# Patient Record
Sex: FEMALE | Race: BLACK OR AFRICAN AMERICAN | NOT HISPANIC OR LATINO
[De-identification: names, ages, dates, MRNs, and addresses within clinical notes are randomized per-mention and may not be internally consistent; named-entity substitution may affect disease eponyms.]

---

## 2019-11-25 ENCOUNTER — RESULT REVIEW (OUTPATIENT)
Age: 44
End: 2019-11-25

## 2019-12-03 ENCOUNTER — OUTPATIENT (OUTPATIENT)
Dept: OUTPATIENT SERVICES | Facility: HOSPITAL | Age: 44
LOS: 1 days | End: 2019-12-03

## 2019-12-03 VITALS
HEART RATE: 68 BPM | DIASTOLIC BLOOD PRESSURE: 80 MMHG | OXYGEN SATURATION: 99 % | WEIGHT: 123.02 LBS | SYSTOLIC BLOOD PRESSURE: 122 MMHG | RESPIRATION RATE: 16 BRPM | HEIGHT: 63 IN | TEMPERATURE: 99 F

## 2019-12-03 DIAGNOSIS — Z84.89 FAMILY HISTORY OF OTHER SPECIFIED CONDITIONS: ICD-10-CM

## 2019-12-03 DIAGNOSIS — D48.62 NEOPLASM OF UNCERTAIN BEHAVIOR OF LEFT BREAST: ICD-10-CM

## 2019-12-03 DIAGNOSIS — Z98.890 OTHER SPECIFIED POSTPROCEDURAL STATES: Chronic | ICD-10-CM

## 2019-12-03 DIAGNOSIS — D48.9 NEOPLASM OF UNCERTAIN BEHAVIOR, UNSPECIFIED: ICD-10-CM

## 2019-12-03 LAB
ANION GAP SERPL CALC-SCNC: 16 MMO/L — HIGH (ref 7–14)
BUN SERPL-MCNC: 10 MG/DL — SIGNIFICANT CHANGE UP (ref 7–23)
CALCIUM SERPL-MCNC: 9.8 MG/DL — SIGNIFICANT CHANGE UP (ref 8.4–10.5)
CHLORIDE SERPL-SCNC: 105 MMOL/L — SIGNIFICANT CHANGE UP (ref 98–107)
CO2 SERPL-SCNC: 18 MMOL/L — LOW (ref 22–31)
CREAT SERPL-MCNC: 0.49 MG/DL — LOW (ref 0.5–1.3)
GLUCOSE SERPL-MCNC: 71 MG/DL — SIGNIFICANT CHANGE UP (ref 70–99)
HCG SERPL-ACNC: < 5 MIU/ML — SIGNIFICANT CHANGE UP
HCT VFR BLD CALC: 36.2 % — SIGNIFICANT CHANGE UP (ref 34.5–45)
HGB BLD-MCNC: 11.8 G/DL — SIGNIFICANT CHANGE UP (ref 11.5–15.5)
MCHC RBC-ENTMCNC: 27.2 PG — SIGNIFICANT CHANGE UP (ref 27–34)
MCHC RBC-ENTMCNC: 32.6 % — SIGNIFICANT CHANGE UP (ref 32–36)
MCV RBC AUTO: 83.4 FL — SIGNIFICANT CHANGE UP (ref 80–100)
NRBC # FLD: 0 K/UL — SIGNIFICANT CHANGE UP (ref 0–0)
PLATELET # BLD AUTO: 331 K/UL — SIGNIFICANT CHANGE UP (ref 150–400)
PMV BLD: 11.3 FL — SIGNIFICANT CHANGE UP (ref 7–13)
POTASSIUM SERPL-MCNC: 3.5 MMOL/L — SIGNIFICANT CHANGE UP (ref 3.5–5.3)
POTASSIUM SERPL-SCNC: 3.5 MMOL/L — SIGNIFICANT CHANGE UP (ref 3.5–5.3)
RBC # BLD: 4.34 M/UL — SIGNIFICANT CHANGE UP (ref 3.8–5.2)
RBC # FLD: 14.9 % — HIGH (ref 10.3–14.5)
SODIUM SERPL-SCNC: 139 MMOL/L — SIGNIFICANT CHANGE UP (ref 135–145)
WBC # BLD: 5.27 K/UL — SIGNIFICANT CHANGE UP (ref 3.8–10.5)
WBC # FLD AUTO: 5.27 K/UL — SIGNIFICANT CHANGE UP (ref 3.8–10.5)

## 2019-12-03 NOTE — H&P PST ADULT - BREASTS DETAILS
left breast mass ; surgeon with original studies h/o left breast mass ; surgeon with original studies

## 2019-12-03 NOTE — H&P PST ADULT - HISTORY OF PRESENT ILLNESS
Pt is a 44 y.o. female ; pt reports recent mammogram ; pt reports bilateral breast nodule ; pt states a biopsy was done " right breast was fine " Pt unclear ; pt to surgeon  regarding left breast "  Pt now presents for Biopsy Left Breast with Cherise   Localization    Pt denies breast pain , denies nipple discharge Pt is a 44 y.o. female ; pt reports recent mammogram ; pt reports bilateral breast nodule ; pt states a biopsy was done " right breast was fine " Pt states biopsy  left breast "suspicious " ; pt to surgeon  regarding left breast "  Pt now presents for Biopsy Left Breast with Cherise  Localization    Pt denies breast pain , denies nipple discharge

## 2019-12-03 NOTE — H&P PST ADULT - NEGATIVE NEUROLOGICAL SYMPTOMS
no transient paralysis/no weakness/no focal seizures/no syncope/no tremors/no paresthesias/no generalized seizures

## 2019-12-03 NOTE — H&P PST ADULT - NSANTHOSAYNRD_GEN_A_CORE
No. GENESIS screening performed.  STOP BANG Legend: 0-2 = LOW Risk; 3-4 = INTERMEDIATE Risk; 5-8 = HIGH Risk

## 2019-12-03 NOTE — H&P PST ADULT - NSICDXPROBLEM_GEN_ALL_CORE_FT
PROBLEM DIAGNOSES  Problem: Family history of neoplasm of uncertain behavior of breast  Assessment and Plan: Biopsy left Breast with Cherise  localization  Pre op instructions including Hibiclens with teach back reviewed with pt ; pt verbalized good understanding of pre op instructions  Pt to Dr Dumont for pre op medical evaluation  Cup provided for [facility] dos PROBLEM DIAGNOSES  Problem: Neoplasm of uncertain behavior  Assessment and Plan: Biopsy  Left Breast with Cherise  Localization   Pre op instructions including Hibiclens with teach back reviewed with pt; pt verbalized good understanding of pre op instructions  Pt to Dr Dumont for pre op medical evaluation  Cup provided for [facility] dos

## 2019-12-06 PROBLEM — Z87.898 PERSONAL HISTORY OF OTHER SPECIFIED CONDITIONS: Chronic | Status: ACTIVE | Noted: 2019-12-03

## 2019-12-16 ENCOUNTER — OUTPATIENT (OUTPATIENT)
Dept: OUTPATIENT SERVICES | Facility: HOSPITAL | Age: 44
LOS: 1 days | End: 2019-12-16
Payer: COMMERCIAL

## 2019-12-16 ENCOUNTER — APPOINTMENT (OUTPATIENT)
Dept: MAMMOGRAPHY | Facility: IMAGING CENTER | Age: 44
End: 2019-12-16
Payer: COMMERCIAL

## 2019-12-16 DIAGNOSIS — Z98.890 OTHER SPECIFIED POSTPROCEDURAL STATES: Chronic | ICD-10-CM

## 2019-12-16 DIAGNOSIS — Z00.8 ENCOUNTER FOR OTHER GENERAL EXAMINATION: ICD-10-CM

## 2019-12-16 PROCEDURE — C1739: CPT

## 2019-12-16 PROCEDURE — 19281 PERQ DEVICE BREAST 1ST IMAG: CPT

## 2019-12-16 PROCEDURE — 19281 PERQ DEVICE BREAST 1ST IMAG: CPT | Mod: LT

## 2019-12-18 ENCOUNTER — TRANSCRIPTION ENCOUNTER (OUTPATIENT)
Age: 44
End: 2019-12-18

## 2019-12-18 PROBLEM — Z00.00 ENCOUNTER FOR PREVENTIVE HEALTH EXAMINATION: Status: ACTIVE | Noted: 2019-12-18

## 2019-12-19 ENCOUNTER — OUTPATIENT (OUTPATIENT)
Dept: OUTPATIENT SERVICES | Facility: HOSPITAL | Age: 44
LOS: 1 days | Discharge: HOME | End: 2019-12-19
Payer: COMMERCIAL

## 2019-12-19 ENCOUNTER — OUTPATIENT (OUTPATIENT)
Dept: OUTPATIENT SERVICES | Facility: HOSPITAL | Age: 44
LOS: 1 days | End: 2019-12-19
Payer: COMMERCIAL

## 2019-12-19 ENCOUNTER — RESULT REVIEW (OUTPATIENT)
Age: 44
End: 2019-12-19

## 2019-12-19 ENCOUNTER — APPOINTMENT (OUTPATIENT)
Dept: MAMMOGRAPHY | Facility: IMAGING CENTER | Age: 44
End: 2019-12-19

## 2019-12-19 VITALS
DIASTOLIC BLOOD PRESSURE: 70 MMHG | SYSTOLIC BLOOD PRESSURE: 127 MMHG | TEMPERATURE: 97 F | HEART RATE: 67 BPM | WEIGHT: 123.02 LBS | RESPIRATION RATE: 18 BRPM | OXYGEN SATURATION: 99 % | HEIGHT: 63 IN

## 2019-12-19 VITALS
OXYGEN SATURATION: 100 % | RESPIRATION RATE: 12 BRPM | TEMPERATURE: 98 F | HEART RATE: 71 BPM | SYSTOLIC BLOOD PRESSURE: 125 MMHG | DIASTOLIC BLOOD PRESSURE: 74 MMHG

## 2019-12-19 DIAGNOSIS — Z98.890 OTHER SPECIFIED POSTPROCEDURAL STATES: Chronic | ICD-10-CM

## 2019-12-19 DIAGNOSIS — Z00.8 ENCOUNTER FOR OTHER GENERAL EXAMINATION: ICD-10-CM

## 2019-12-19 DIAGNOSIS — D48.62 NEOPLASM OF UNCERTAIN BEHAVIOR OF LEFT BREAST: ICD-10-CM

## 2019-12-19 PROCEDURE — 76098 X-RAY EXAM SURGICAL SPECIMEN: CPT | Mod: 26

## 2019-12-19 PROCEDURE — 88305 TISSUE EXAM BY PATHOLOGIST: CPT | Mod: 26

## 2019-12-19 PROCEDURE — 76098 X-RAY EXAM SURGICAL SPECIMEN: CPT

## 2019-12-19 RX ORDER — ACETAMINOPHEN WITH CODEINE 300MG-30MG
1 TABLET ORAL
Qty: 10 | Refills: 0
Start: 2019-12-19

## 2019-12-19 RX ORDER — SODIUM CHLORIDE 9 MG/ML
1000 INJECTION, SOLUTION INTRAVENOUS
Refills: 0 | Status: DISCONTINUED | OUTPATIENT
Start: 2019-12-19 | End: 2020-01-11

## 2019-12-19 NOTE — ASU DISCHARGE PLAN (ADULT/PEDIATRIC) - CALL YOUR DOCTOR IF YOU HAVE ANY OF THE FOLLOWING:
Nausea and vomiting that does not stop/Numbness, tingling, color or temperature change to extremity/Swelling that gets worse/Pain not relieved by Medications/Wound/Surgical Site with redness, or foul smelling discharge or pus/Increased irritability or sluggishness/Fever greater than (need to indicate Fahrenheit or Celsius)/Bleeding that does not stop Nausea and vomiting that does not stop/Unable to urinate/Swelling that gets worse/Pain not relieved by Medications/Fever greater than (need to indicate Fahrenheit or Celsius)/Bleeding that does not stop/Increased irritability or sluggishness/Wound/Surgical Site with redness, or foul smelling discharge or pus/Numbness, tingling, color or temperature change to extremity

## 2019-12-19 NOTE — BRIEF OPERATIVE NOTE - NSICDXBRIEFPROCEDURE_GEN_ALL_CORE_FT
PROCEDURES:  Biopsy, breast, with radar localization using MURTAZA  19-Dec-2019 13:10:34  Mamadou Charles

## 2019-12-19 NOTE — ASU DISCHARGE PLAN (ADULT/PEDIATRIC) - PAIN MANAGEMENT
Prescriptions electronically submitted to pharmacy from [facility] No advil/ aleve until after 6pm/Prescriptions electronically submitted to pharmacy from [facility]

## 2019-12-19 NOTE — ASU DISCHARGE PLAN (ADULT/PEDIATRIC) - FOLLOW UP APPOINTMENTS
911 or go to the nearest Emergency Room [facility] ([facility]):

## 2019-12-19 NOTE — ASU DISCHARGE PLAN (ADULT/PEDIATRIC) - BATHING
You may shower tomorrow. Do not remove your dressing when you shower, it will be removed at your follow up appointment./Shower only

## 2019-12-19 NOTE — ASU DISCHARGE PLAN (ADULT/PEDIATRIC) - CARE PROVIDER_API CALL
Miguel Hanson)  Surgery  [address] [place] [address]  Phone: [phone]  Fax: [phone]  Follow Up Time: 1 week

## 2019-12-24 LAB — SURGICAL PATHOLOGY STUDY: SIGNIFICANT CHANGE UP

## 2023-12-22 ENCOUNTER — INPATIENT (INPATIENT)
Facility: HOSPITAL | Age: 48
LOS: 4 days | Discharge: HOME | End: 2023-12-27
Attending: STUDENT IN AN ORGANIZED HEALTH CARE EDUCATION/TRAINING PROGRAM | Admitting: STUDENT IN AN ORGANIZED HEALTH CARE EDUCATION/TRAINING PROGRAM
Payer: COMMERCIAL

## 2023-12-22 VITALS
RESPIRATION RATE: 18 BRPM | SYSTOLIC BLOOD PRESSURE: 122 MMHG | HEART RATE: 105 BPM | TEMPERATURE: 97 F | OXYGEN SATURATION: 99 % | DIASTOLIC BLOOD PRESSURE: 85 MMHG

## 2023-12-22 DIAGNOSIS — Z98.890 OTHER SPECIFIED POSTPROCEDURAL STATES: Chronic | ICD-10-CM

## 2023-12-22 DIAGNOSIS — R41.82 ALTERED MENTAL STATUS, UNSPECIFIED: ICD-10-CM

## 2023-12-22 DIAGNOSIS — R41.89 OTHER SYMPTOMS AND SIGNS INVOLVING COGNITIVE FUNCTIONS AND AWARENESS: ICD-10-CM

## 2023-12-22 LAB
ALBUMIN SERPL ELPH-MCNC: 3.8 G/DL — SIGNIFICANT CHANGE UP (ref 3.3–5)
ALBUMIN SERPL ELPH-MCNC: 3.8 G/DL — SIGNIFICANT CHANGE UP (ref 3.3–5)
ALP SERPL-CCNC: 83 U/L — SIGNIFICANT CHANGE UP (ref 40–120)
ALP SERPL-CCNC: 83 U/L — SIGNIFICANT CHANGE UP (ref 40–120)
ALT FLD-CCNC: 11 U/L — SIGNIFICANT CHANGE UP (ref 4–33)
ALT FLD-CCNC: 11 U/L — SIGNIFICANT CHANGE UP (ref 4–33)
ANION GAP SERPL CALC-SCNC: 21 MMOL/L — HIGH (ref 7–14)
ANION GAP SERPL CALC-SCNC: 21 MMOL/L — HIGH (ref 7–14)
APTT BLD: 31.3 SEC — SIGNIFICANT CHANGE UP (ref 24.5–35.6)
APTT BLD: 31.3 SEC — SIGNIFICANT CHANGE UP (ref 24.5–35.6)
AST SERPL-CCNC: 18 U/L — SIGNIFICANT CHANGE UP (ref 4–32)
AST SERPL-CCNC: 18 U/L — SIGNIFICANT CHANGE UP (ref 4–32)
B PERT DNA SPEC QL NAA+PROBE: SIGNIFICANT CHANGE UP
B PERT DNA SPEC QL NAA+PROBE: SIGNIFICANT CHANGE UP
B PERT+PARAPERT DNA PNL SPEC NAA+PROBE: SIGNIFICANT CHANGE UP
B PERT+PARAPERT DNA PNL SPEC NAA+PROBE: SIGNIFICANT CHANGE UP
BASOPHILS # BLD AUTO: 0.04 K/UL — SIGNIFICANT CHANGE UP (ref 0–0.2)
BASOPHILS # BLD AUTO: 0.04 K/UL — SIGNIFICANT CHANGE UP (ref 0–0.2)
BASOPHILS NFR BLD AUTO: 0.6 % — SIGNIFICANT CHANGE UP (ref 0–2)
BASOPHILS NFR BLD AUTO: 0.6 % — SIGNIFICANT CHANGE UP (ref 0–2)
BILIRUB SERPL-MCNC: 0.2 MG/DL — SIGNIFICANT CHANGE UP (ref 0.2–1.2)
BILIRUB SERPL-MCNC: 0.2 MG/DL — SIGNIFICANT CHANGE UP (ref 0.2–1.2)
BORDETELLA PARAPERTUSSIS (RAPRVP): SIGNIFICANT CHANGE UP
BORDETELLA PARAPERTUSSIS (RAPRVP): SIGNIFICANT CHANGE UP
BUN SERPL-MCNC: 13 MG/DL — SIGNIFICANT CHANGE UP (ref 7–23)
BUN SERPL-MCNC: 13 MG/DL — SIGNIFICANT CHANGE UP (ref 7–23)
C PNEUM DNA SPEC QL NAA+PROBE: SIGNIFICANT CHANGE UP
C PNEUM DNA SPEC QL NAA+PROBE: SIGNIFICANT CHANGE UP
CALCIUM SERPL-MCNC: 9.2 MG/DL — SIGNIFICANT CHANGE UP (ref 8.4–10.5)
CALCIUM SERPL-MCNC: 9.2 MG/DL — SIGNIFICANT CHANGE UP (ref 8.4–10.5)
CHLORIDE SERPL-SCNC: 102 MMOL/L — SIGNIFICANT CHANGE UP (ref 98–107)
CHLORIDE SERPL-SCNC: 102 MMOL/L — SIGNIFICANT CHANGE UP (ref 98–107)
CO2 SERPL-SCNC: 16 MMOL/L — LOW (ref 22–31)
CO2 SERPL-SCNC: 16 MMOL/L — LOW (ref 22–31)
CREAT SERPL-MCNC: 0.54 MG/DL — SIGNIFICANT CHANGE UP (ref 0.5–1.3)
CREAT SERPL-MCNC: 0.54 MG/DL — SIGNIFICANT CHANGE UP (ref 0.5–1.3)
EGFR: 113 ML/MIN/1.73M2 — SIGNIFICANT CHANGE UP
EGFR: 113 ML/MIN/1.73M2 — SIGNIFICANT CHANGE UP
EOSINOPHIL # BLD AUTO: 0.01 K/UL — SIGNIFICANT CHANGE UP (ref 0–0.5)
EOSINOPHIL # BLD AUTO: 0.01 K/UL — SIGNIFICANT CHANGE UP (ref 0–0.5)
EOSINOPHIL NFR BLD AUTO: 0.2 % — SIGNIFICANT CHANGE UP (ref 0–6)
EOSINOPHIL NFR BLD AUTO: 0.2 % — SIGNIFICANT CHANGE UP (ref 0–6)
ETHANOL SERPL-MCNC: <10 MG/DL — SIGNIFICANT CHANGE UP
ETHANOL SERPL-MCNC: <10 MG/DL — SIGNIFICANT CHANGE UP
FLUAV SUBTYP SPEC NAA+PROBE: SIGNIFICANT CHANGE UP
FLUAV SUBTYP SPEC NAA+PROBE: SIGNIFICANT CHANGE UP
FLUBV RNA SPEC QL NAA+PROBE: SIGNIFICANT CHANGE UP
FLUBV RNA SPEC QL NAA+PROBE: SIGNIFICANT CHANGE UP
GLUCOSE SERPL-MCNC: 95 MG/DL — SIGNIFICANT CHANGE UP (ref 70–99)
GLUCOSE SERPL-MCNC: 95 MG/DL — SIGNIFICANT CHANGE UP (ref 70–99)
HADV DNA SPEC QL NAA+PROBE: SIGNIFICANT CHANGE UP
HADV DNA SPEC QL NAA+PROBE: SIGNIFICANT CHANGE UP
HCG SERPL-ACNC: <1 MIU/ML — SIGNIFICANT CHANGE UP
HCG SERPL-ACNC: <1 MIU/ML — SIGNIFICANT CHANGE UP
HCOV 229E RNA SPEC QL NAA+PROBE: SIGNIFICANT CHANGE UP
HCOV 229E RNA SPEC QL NAA+PROBE: SIGNIFICANT CHANGE UP
HCOV HKU1 RNA SPEC QL NAA+PROBE: SIGNIFICANT CHANGE UP
HCOV HKU1 RNA SPEC QL NAA+PROBE: SIGNIFICANT CHANGE UP
HCOV NL63 RNA SPEC QL NAA+PROBE: SIGNIFICANT CHANGE UP
HCOV NL63 RNA SPEC QL NAA+PROBE: SIGNIFICANT CHANGE UP
HCOV OC43 RNA SPEC QL NAA+PROBE: SIGNIFICANT CHANGE UP
HCOV OC43 RNA SPEC QL NAA+PROBE: SIGNIFICANT CHANGE UP
HCT VFR BLD CALC: 34 % — LOW (ref 34.5–45)
HCT VFR BLD CALC: 34 % — LOW (ref 34.5–45)
HGB BLD-MCNC: 10.3 G/DL — LOW (ref 11.5–15.5)
HGB BLD-MCNC: 10.3 G/DL — LOW (ref 11.5–15.5)
HMPV RNA SPEC QL NAA+PROBE: SIGNIFICANT CHANGE UP
HMPV RNA SPEC QL NAA+PROBE: SIGNIFICANT CHANGE UP
HPIV1 RNA SPEC QL NAA+PROBE: SIGNIFICANT CHANGE UP
HPIV1 RNA SPEC QL NAA+PROBE: SIGNIFICANT CHANGE UP
HPIV2 RNA SPEC QL NAA+PROBE: SIGNIFICANT CHANGE UP
HPIV2 RNA SPEC QL NAA+PROBE: SIGNIFICANT CHANGE UP
HPIV3 RNA SPEC QL NAA+PROBE: SIGNIFICANT CHANGE UP
HPIV3 RNA SPEC QL NAA+PROBE: SIGNIFICANT CHANGE UP
HPIV4 RNA SPEC QL NAA+PROBE: SIGNIFICANT CHANGE UP
HPIV4 RNA SPEC QL NAA+PROBE: SIGNIFICANT CHANGE UP
IANC: 3.51 K/UL — SIGNIFICANT CHANGE UP (ref 1.8–7.4)
IANC: 3.51 K/UL — SIGNIFICANT CHANGE UP (ref 1.8–7.4)
IMM GRANULOCYTES NFR BLD AUTO: 0.3 % — SIGNIFICANT CHANGE UP (ref 0–0.9)
IMM GRANULOCYTES NFR BLD AUTO: 0.3 % — SIGNIFICANT CHANGE UP (ref 0–0.9)
INR BLD: 1.09 RATIO — SIGNIFICANT CHANGE UP (ref 0.85–1.18)
INR BLD: 1.09 RATIO — SIGNIFICANT CHANGE UP (ref 0.85–1.18)
LYMPHOCYTES # BLD AUTO: 2.26 K/UL — SIGNIFICANT CHANGE UP (ref 1–3.3)
LYMPHOCYTES # BLD AUTO: 2.26 K/UL — SIGNIFICANT CHANGE UP (ref 1–3.3)
LYMPHOCYTES # BLD AUTO: 35.4 % — SIGNIFICANT CHANGE UP (ref 13–44)
LYMPHOCYTES # BLD AUTO: 35.4 % — SIGNIFICANT CHANGE UP (ref 13–44)
M PNEUMO DNA SPEC QL NAA+PROBE: SIGNIFICANT CHANGE UP
M PNEUMO DNA SPEC QL NAA+PROBE: SIGNIFICANT CHANGE UP
MAGNESIUM SERPL-MCNC: 1.8 MG/DL — SIGNIFICANT CHANGE UP (ref 1.6–2.6)
MAGNESIUM SERPL-MCNC: 1.8 MG/DL — SIGNIFICANT CHANGE UP (ref 1.6–2.6)
MCHC RBC-ENTMCNC: 25.4 PG — LOW (ref 27–34)
MCHC RBC-ENTMCNC: 25.4 PG — LOW (ref 27–34)
MCHC RBC-ENTMCNC: 30.3 GM/DL — LOW (ref 32–36)
MCHC RBC-ENTMCNC: 30.3 GM/DL — LOW (ref 32–36)
MCV RBC AUTO: 83.7 FL — SIGNIFICANT CHANGE UP (ref 80–100)
MCV RBC AUTO: 83.7 FL — SIGNIFICANT CHANGE UP (ref 80–100)
MONOCYTES # BLD AUTO: 0.54 K/UL — SIGNIFICANT CHANGE UP (ref 0–0.9)
MONOCYTES # BLD AUTO: 0.54 K/UL — SIGNIFICANT CHANGE UP (ref 0–0.9)
MONOCYTES NFR BLD AUTO: 8.5 % — SIGNIFICANT CHANGE UP (ref 2–14)
MONOCYTES NFR BLD AUTO: 8.5 % — SIGNIFICANT CHANGE UP (ref 2–14)
NEUTROPHILS # BLD AUTO: 3.51 K/UL — SIGNIFICANT CHANGE UP (ref 1.8–7.4)
NEUTROPHILS # BLD AUTO: 3.51 K/UL — SIGNIFICANT CHANGE UP (ref 1.8–7.4)
NEUTROPHILS NFR BLD AUTO: 55 % — SIGNIFICANT CHANGE UP (ref 43–77)
NEUTROPHILS NFR BLD AUTO: 55 % — SIGNIFICANT CHANGE UP (ref 43–77)
NRBC # BLD: 0 /100 WBCS — SIGNIFICANT CHANGE UP (ref 0–0)
NRBC # BLD: 0 /100 WBCS — SIGNIFICANT CHANGE UP (ref 0–0)
NRBC # FLD: 0 K/UL — SIGNIFICANT CHANGE UP (ref 0–0)
NRBC # FLD: 0 K/UL — SIGNIFICANT CHANGE UP (ref 0–0)
PHOSPHATE SERPL-MCNC: 4.1 MG/DL — SIGNIFICANT CHANGE UP (ref 2.5–4.5)
PHOSPHATE SERPL-MCNC: 4.1 MG/DL — SIGNIFICANT CHANGE UP (ref 2.5–4.5)
PLATELET # BLD AUTO: 343 K/UL — SIGNIFICANT CHANGE UP (ref 150–400)
PLATELET # BLD AUTO: 343 K/UL — SIGNIFICANT CHANGE UP (ref 150–400)
POTASSIUM SERPL-MCNC: 3.6 MMOL/L — SIGNIFICANT CHANGE UP (ref 3.5–5.3)
POTASSIUM SERPL-MCNC: 3.6 MMOL/L — SIGNIFICANT CHANGE UP (ref 3.5–5.3)
POTASSIUM SERPL-SCNC: 3.6 MMOL/L — SIGNIFICANT CHANGE UP (ref 3.5–5.3)
POTASSIUM SERPL-SCNC: 3.6 MMOL/L — SIGNIFICANT CHANGE UP (ref 3.5–5.3)
PROT SERPL-MCNC: 7.7 G/DL — SIGNIFICANT CHANGE UP (ref 6–8.3)
PROT SERPL-MCNC: 7.7 G/DL — SIGNIFICANT CHANGE UP (ref 6–8.3)
PROTHROM AB SERPL-ACNC: 12.2 SEC — SIGNIFICANT CHANGE UP (ref 9.5–13)
PROTHROM AB SERPL-ACNC: 12.2 SEC — SIGNIFICANT CHANGE UP (ref 9.5–13)
RAPID RVP RESULT: SIGNIFICANT CHANGE UP
RAPID RVP RESULT: SIGNIFICANT CHANGE UP
RBC # BLD: 4.06 M/UL — SIGNIFICANT CHANGE UP (ref 3.8–5.2)
RBC # BLD: 4.06 M/UL — SIGNIFICANT CHANGE UP (ref 3.8–5.2)
RBC # FLD: 16 % — HIGH (ref 10.3–14.5)
RBC # FLD: 16 % — HIGH (ref 10.3–14.5)
RSV RNA SPEC QL NAA+PROBE: SIGNIFICANT CHANGE UP
RSV RNA SPEC QL NAA+PROBE: SIGNIFICANT CHANGE UP
RV+EV RNA SPEC QL NAA+PROBE: SIGNIFICANT CHANGE UP
RV+EV RNA SPEC QL NAA+PROBE: SIGNIFICANT CHANGE UP
SARS-COV-2 RNA SPEC QL NAA+PROBE: SIGNIFICANT CHANGE UP
SARS-COV-2 RNA SPEC QL NAA+PROBE: SIGNIFICANT CHANGE UP
SODIUM SERPL-SCNC: 139 MMOL/L — SIGNIFICANT CHANGE UP (ref 135–145)
SODIUM SERPL-SCNC: 139 MMOL/L — SIGNIFICANT CHANGE UP (ref 135–145)
TROPONIN T, HIGH SENSITIVITY RESULT: 6 NG/L — SIGNIFICANT CHANGE UP
TROPONIN T, HIGH SENSITIVITY RESULT: 6 NG/L — SIGNIFICANT CHANGE UP
WBC # BLD: 6.38 K/UL — SIGNIFICANT CHANGE UP (ref 3.8–10.5)
WBC # BLD: 6.38 K/UL — SIGNIFICANT CHANGE UP (ref 3.8–10.5)
WBC # FLD AUTO: 6.38 K/UL — SIGNIFICANT CHANGE UP (ref 3.8–10.5)
WBC # FLD AUTO: 6.38 K/UL — SIGNIFICANT CHANGE UP (ref 3.8–10.5)

## 2023-12-22 PROCEDURE — 99254 IP/OBS CNSLTJ NEW/EST MOD 60: CPT

## 2023-12-22 PROCEDURE — 70498 CT ANGIOGRAPHY NECK: CPT | Mod: 26,MA

## 2023-12-22 PROCEDURE — 70450 CT HEAD/BRAIN W/O DYE: CPT | Mod: 26,59,MA

## 2023-12-22 PROCEDURE — 99223 1ST HOSP IP/OBS HIGH 75: CPT

## 2023-12-22 PROCEDURE — 70496 CT ANGIOGRAPHY HEAD: CPT | Mod: 26,MA

## 2023-12-22 PROCEDURE — 0042T: CPT | Mod: MA

## 2023-12-22 PROCEDURE — 99285 EMERGENCY DEPT VISIT HI MDM: CPT

## 2023-12-22 RX ORDER — LANOLIN ALCOHOL/MO/W.PET/CERES
3 CREAM (GRAM) TOPICAL AT BEDTIME
Refills: 0 | Status: DISCONTINUED | OUTPATIENT
Start: 2023-12-22 | End: 2023-12-27

## 2023-12-22 RX ORDER — ENOXAPARIN SODIUM 100 MG/ML
40 INJECTION SUBCUTANEOUS EVERY 24 HOURS
Refills: 0 | Status: DISCONTINUED | OUTPATIENT
Start: 2023-12-23 | End: 2023-12-27

## 2023-12-22 RX ORDER — ACETAMINOPHEN 500 MG
650 TABLET ORAL EVERY 6 HOURS
Refills: 0 | Status: DISCONTINUED | OUTPATIENT
Start: 2023-12-22 | End: 2023-12-27

## 2023-12-22 RX ORDER — ONDANSETRON 8 MG/1
4 TABLET, FILM COATED ORAL EVERY 8 HOURS
Refills: 0 | Status: DISCONTINUED | OUTPATIENT
Start: 2023-12-22 | End: 2023-12-27

## 2023-12-22 RX ORDER — SODIUM CHLORIDE 9 MG/ML
1000 INJECTION INTRAMUSCULAR; INTRAVENOUS; SUBCUTANEOUS ONCE
Refills: 0 | Status: COMPLETED | OUTPATIENT
Start: 2023-12-22 | End: 2023-12-22

## 2023-12-22 RX ORDER — LEVETIRACETAM 250 MG/1
1000 TABLET, FILM COATED ORAL ONCE
Refills: 0 | Status: DISCONTINUED | OUTPATIENT
Start: 2023-12-22 | End: 2023-12-22

## 2023-12-22 RX ADMIN — SODIUM CHLORIDE 1000 MILLILITER(S): 9 INJECTION INTRAMUSCULAR; INTRAVENOUS; SUBCUTANEOUS at 15:25

## 2023-12-22 RX ADMIN — SODIUM CHLORIDE 1000 MILLILITER(S): 9 INJECTION INTRAMUSCULAR; INTRAVENOUS; SUBCUTANEOUS at 17:04

## 2023-12-22 RX ADMIN — LEVETIRACETAM 1000 MILLIGRAM(S): 250 TABLET, FILM COATED ORAL at 15:15

## 2023-12-22 NOTE — H&P ADULT - NSHPPHYSICALEXAM_GEN_ALL_CORE
PHYSICAL EXAM:  GENERAL: NAD, comfortable at bedside   HEAD:  Atraumatic, Normocephalic  EYES: EOMI, PERRL, conjunctiva and sclera clear  NECK: Supple, No JVD  CHEST/LUNG: Clear to auscultation bilaterally; No wheezes, rales or rhonchi  HEART: Regular rate and rhythm; No murmurs, rubs, or gallops, (+)S1, S2  ABDOMEN: Soft, Nontender, Nondistended; Normal Bowel sounds   EXTREMITIES:  2+ Peripheral Pulses, No clubbing, cyanosis, or edema  PSYCH: normal mood and affect  NEUROLOGY: AAOx3, CN 2-12 grossly intact,  5/5 strength in all extremities, no facial droop or slurred speech, gait not assessed   SKIN: No rashes or lesions

## 2023-12-22 NOTE — H&P ADULT - NSHPREVIEWOFSYSTEMS_GEN_ALL_CORE
REVIEW OF SYSTEMS:    CONSTITUTIONAL: No weakness, fevers or chills  EYES/ENT: No visual changes;  No dysphagia; No sore throat; No rhinorrhea; No sinus pain/pressure  NECK: No pain or stiffness  RESPIRATORY: No cough, wheezing, hemoptysis; No shortness of breath  CARDIOVASCULAR: No chest pain or palpitations; No lower extremity edema  GASTROINTESTINAL: No abdominal or epigastric pain. No nausea, vomiting, or hematemesis; No diarrhea or constipation. No melena or hematochezia.  GENITOURINARY: No dysuria, frequency or hematuria  NEUROLOGICAL: No numbness or weakness + episode of unresponsiveness with eyes rolling to the back of her head  MSK: ambulates without assistance   SKIN: No itching, burning, rashes, or lesions   All other review of systems is negative unless indicated above.

## 2023-12-22 NOTE — ED PROVIDER NOTE - PHYSICAL EXAMINATION
General: Appears lethargic  Mentation: AAO x 2  HEENT: airway patent, conjunctivae clear bilaterally  Resp: symmetric chest rise, no resp distress, breath sounds CTA bilaterally  Cardio: RRR, no m/r/g  GI: soft/nondistended/nontender  Neuro: sensation and motor function grossly intact, Following commands, moving all extremities  Skin: no cyanosis, no jaundice  MSK: normal movement of all extremities  Lymph/Vasc: no LE edema

## 2023-12-22 NOTE — ED PROVIDER NOTE - CLINICAL SUMMARY MEDICAL DECISION MAKING FREE TEXT BOX
48-year-old female presenting with concern for possible seizure versus syncope versus stroke. 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 Patient was slow to return to baseline. Labs, CT scan, neurology consult.

## 2023-12-22 NOTE — ED ADULT TRIAGE NOTE - CHIEF COMPLAINT QUOTE
Pt is an employee per [employer] appeared that she had a seizure and became unresponsive, pt arrives into room 18 eyes open but with  asphasia  able to follow some commands but not speech and confused. code stroke called. fs 84.

## 2023-12-22 NOTE — CONSULT NOTE ADULT - SUBJECTIVE AND OBJECTIVE BOX
Neurology - Consult Note    -  Spectra: [phone] ([facility]), [phone] ([facility])  -    HPI: Patient RALPH BATES is a 48y (1975) woman with unclear PMH presenting as code stroke for decreased verbal output. 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 Her eyes then rolled back and she was foaming out of her mouth. No shaking of extremities, incontinence, tongue biting, weakness, numbness, headache, neck pain. Her eyes were rolled back form the time she was in the endoscopt suite to the ED, over 15 minutes. Patient does not recall event of earlier today. At baseline, patient is independent and ambulates without assistance.     NIHSS 1  mRS 0   LKN 2:40 pm 12/22    Review of Systems: All other review of systems is negative unless indicated above.    Allergies:  No Known Allergies      PMHx/PSHx/Family Hx: As above, otherwise see below   No pertinent past medical history    History of abnormal Pap smear        Social Hx:  No current use of tobacco, alcohol, or illicit drugs      Medications:  MEDICATIONS  (STANDING):    MEDICATIONS  (PRN):      Vitals:  T(C): --  HR: --  BP: --  RR: --  SpO2: --    Physical Examination: INCOMPLETE  General - NAD      Neurologic Exam:  Mental status - Awake, Alert, Oriented to person, place, and time. Speech clear, fluent, repetition and naming intact. Follows simple and complex commands.   Attention/concentration, recent and remote memory (including registration and recall), and fund of knowledge intact    Cranial nerves - PERRL, BTT b/l, EOMI, face sensation (V1-V3) intact b/l, facial strength intact without asymmetry b/l, hearing intact b/l, palate with symmetric elevation, trapezius 5/5 strength b/l, tongue midline on protrusion with full lateral movement    Motor - Normal bulk and tone throughout. No pronator drift.  Strength testing            Deltoid      Biceps      Triceps     Wrist Extension    Wrist Flexion     Interossei         R            5                 5               5                     5                              5                        5                 5  L             5                 5               5                     5                              5                        5                 5              Hip Flexion    Hip Extension    Knee Flexion    Knee Extension    Dorsiflexion    Plantar Flexion  R              5                           5                       5                           5                            5                          5  L              5                           5                        5                           5                            5                          5    Sensation - Light touch intact throughout    DTR's -             Biceps      Triceps     Brachioradialis      Patellar    Ankle    Toes/plantar response  R             2+             2+                  2+                       2+            2+                 Down  L              2+             2+                 2+                        2+           2+                 Down    Coordination - Finger to Nose intact b/l. No tremors appreciated    Gait and station - Deffered due to fall/safety risk     Labs:          CAPILLARY BLOOD GLUCOSE                  Radiology:     Neurology - Consult Note    -  Spectra: [phone] ([facility]), [phone] ([facility])  -    HPI: Patient RALPH BATES is a 48y (1975) woman with unclear PMH presenting as code stroke for decreased verbal output. 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 Her eyes then rolled back and she was foaming out of her mouth. No shaking of extremities, incontinence, tongue biting, weakness, numbness, headache, neck pain. Her eyes were rolled back form the time she was in the endoscopt suite to the ED, over 15 minutes. Patient does not recall event of earlier today. At baseline, patient is independent and ambulates without assistance.     NIHSS 1  mRS 0   LKN 2:40 pm 12/22    Review of Systems: All other review of systems is negative unless indicated above.    Allergies:  No Known Allergies      PMHx/PSHx/Family Hx: As above, otherwise see below   No pertinent past medical history    History of abnormal Pap smear        Social Hx:  No current use of tobacco, alcohol, or illicit drugs      Medications:  MEDICATIONS  (STANDING):    MEDICATIONS  (PRN):      Vitals:  T(C): --  HR: --  BP: --  RR: --  SpO2: --    Physical Examination: INCOMPLETE  General - NAD      Neurologic Exam:  Mental status - Awake, Alert, Oriented to person, place, and time. Speech clear, fluent, repetition and naming intact. Follows simple and complex commands.   Attention/concentration, recent and remote memory (including registration and recall), and fund of knowledge intact    Cranial nerves - PERRL, BTT b/l, EOMI, face sensation (V1-V3) intact b/l, facial strength intact without asymmetry b/l, hearing intact b/l, palate with symmetric elevation, trapezius 5/5 strength b/l, tongue midline on protrusion with full lateral movement    Motor - Normal bulk and tone throughout. No pronator drift.  Strength testing            Deltoid      Biceps      Triceps     Wrist Extension    Wrist Flexion     Interossei         R            5                 5               5                     5                              5                        5                 5  L             5                 5               5                     5                              5                        5                 5              Hip Flexion    Hip Extension    Knee Flexion    Knee Extension    Dorsiflexion    Plantar Flexion  R              5                           5                       5                           5                            5                          5  L              5                           5                        5                           5                            5                          5    Sensation - Light touch intact throughout    DTR's -             Biceps      Triceps     Brachioradialis      Patellar    Ankle    Toes/plantar response  R             2+             2+                  2+                       2+            2+                 Down  L              2+             2+                 2+                        2+           2+                 Down    Coordination - Finger to Nose intact b/l. No tremors appreciated    Gait and station - Deffered due to fall/safety risk     Labs:          CAPILLARY BLOOD GLUCOSE                  Radiology:     Neurology - Consult Note    -  Spectra: [phone] ([facility]), [phone] ([facility])  -    HPI: Patient RALPH BATES is a 48y (1975) woman with unclear PMH presenting as code stroke for decreased verbal output. 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 Her eyes then rolled back and she was foaming out of her mouth. No shaking of extremities, incontinence, tongue biting, weakness, numbness, headache, neck pain. Her eyes were rolled back form the time she was in the endoscopt suite to the ED, over 15 minutes. Patient does not recall event of earlier today. At baseline, patient is independent and ambulates without assistance.     NIHSS 1  mRS 0   LKN 2:40 pm 12/22    Review of Systems: All other review of systems is negative unless indicated above.    Allergies:  No Known Allergies      PMHx/PSHx/Family Hx: As above, otherwise see below   No pertinent past medical history    History of abnormal Pap smear        Social Hx:  No current use of tobacco, alcohol, or illicit drugs      Medications:  MEDICATIONS  (STANDING):    MEDICATIONS  (PRN):      Vitals:  T(C): --  HR: --  BP: --  RR: --  SpO2: --    Physical Examination: INCOMPLETE  General - NAD      Neurologic Exam:  Mental status - Awake, Alert, Oriented to person, place, and time. Speech clear, fluent, repetition and naming intact. Follows simple and complex commands.   Attention/concentration, recent and remote memory (including registration and recall), and fund of knowledge intact    Cranial nerves - PERRL, BTT b/l, EOMI, face sensation (V1-V3) intact b/l, facial strength intact without asymmetry b/l, hearing intact b/l, palate with symmetric elevation, trapezius 5/5 strength b/l, tongue midline on protrusion with full lateral movement    Motor - Normal bulk and tone throughout. No pronator drift.  Strength testing            Deltoid      Biceps      Triceps     Wrist Extension    Wrist Flexion     Interossei         R            5                 5               5                     5                              5                        5                 5  L             5                 5               5                     5                              5                        5                 5              Hip Flexion    Hip Extension    Knee Flexion    Knee Extension    Dorsiflexion    Plantar Flexion  R              5                           5                       5                           5                            5                          5  L              5                           5                        5                           5                            5                          5    Sensation - Light touch intact throughout    Coordination - Finger to Nose intact b/l. No tremors appreciated    Gait and station - Deffered due to fall/safety risk     Labs:          CAPILLARY BLOOD GLUCOSE                  Radiology:     Neurology - Consult Note    -  Spectra: [phone] ([facility]), [phone] ([facility])  -    HPI: Patient RALPH BATES is a 48y (1975) woman with unclear PMH presenting as code stroke for decreased verbal output. 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 Her eyes then rolled back and she was foaming out of her mouth. No shaking of extremities, incontinence, tongue biting, weakness, numbness, headache, neck pain. Her eyes were rolled back form the time she was in the endoscopt suite to the ED, over 15 minutes. Patient does not recall event of earlier today. At baseline, patient is independent and ambulates without assistance.     NIHSS 1  mRS 0   LKN 2:40 pm 12/22    Review of Systems: All other review of systems is negative unless indicated above.    Allergies:  No Known Allergies      PMHx/PSHx/Family Hx: As above, otherwise see below   No pertinent past medical history    History of abnormal Pap smear        Social Hx:  No current use of tobacco, alcohol, or illicit drugs      Medications:  MEDICATIONS  (STANDING):    MEDICATIONS  (PRN):      Vitals:  T(C): --  HR: --  BP: --  RR: --  SpO2: --    Physical Examination: INCOMPLETE  General - NAD      Neurologic Exam:  Mental status - Awake, Alert, Oriented to person, place, and time. Speech clear, fluent, repetition and naming intact. Follows simple and complex commands.   Attention/concentration, recent and remote memory (including registration and recall), and fund of knowledge intact    Cranial nerves - PERRL, BTT b/l, EOMI, face sensation (V1-V3) intact b/l, facial strength intact without asymmetry b/l, hearing intact b/l, palate with symmetric elevation, trapezius 5/5 strength b/l, tongue midline on protrusion with full lateral movement    Motor - Normal bulk and tone throughout. No pronator drift.  Strength testing            Deltoid      Biceps      Triceps     Wrist Extension    Wrist Flexion     Interossei         R            5                 5               5                     5                              5                        5                 5  L             5                 5               5                     5                              5                        5                 5              Hip Flexion    Hip Extension    Knee Flexion    Knee Extension    Dorsiflexion    Plantar Flexion  R              5                           5                       5                           5                            5                          5  L              5                           5                        5                           5                            5                          5    Sensation - Light touch intact throughout    Coordination - Finger to Nose intact b/l. No tremors appreciated    Gait and station - Deffered due to fall/safety risk     Labs:          CAPILLARY BLOOD GLUCOSE                  Radiology:     Neurology - Consult Note    -  Spectra: [phone] ([facility]), [phone] ([facility])  -    HPI: Patient RALPH BATES is a 48y (1975) woman with unclear PMH presenting as code stroke for decreased verbal output. 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 Her eyes then rolled back and she was foaming out of her mouth. Co-workers were near by and able to catch her before her fall. No shaking of extremities, incontinence, tongue biting, weakness, numbness, headache, neck pain, recent fever, chills, previous head trauma, Hx seizures. Her eyes were rolled back form the time she was in the endoscopy suite to the ED, over 15 minutes. Patient does not recall event of earlier today initally. After CT scan she recall feeling lightheaded and pain to left eye. At baseline, patient is independent and ambulates without assistance.     NIHSS 1  mRS 0   LKN 2:40 pm 12/22    Review of Systems: All other review of systems is negative unless indicated above.    Allergies:  No Known Allergies      PMHx/PSHx/Family Hx: As above, otherwise see below   No pertinent past medical history    History of abnormal Pap smear        Social Hx:  No current use of tobacco, alcohol, or illicit drugs      Medications:  MEDICATIONS  (STANDING):    MEDICATIONS  (PRN):      Vitals:  T(C): --  HR: --  BP: --  RR: --  SpO2: --    Physical Examination:   General - NAD      Neurologic Exam:  Mental status - Awake, Alert, Oriented to person, place, and time. Speech clear, fluent, repetition and naming intact. Follows simple and complex commands.   Attention/concentration, know current president, quarters in $1, says 5 quarters in a $1.75    Cranial nerves - PERRL, BTT b/l, EOMI, face sensation (V1-V3) intact b/l, facial strength intact without asymmetry b/l, hearing intact b/l, palate with symmetric elevation, trapezius 5/5 strength b/l, tongue midline on protrusion with full lateral movement    Motor - Normal bulk and tone throughout. No pronator drift.  Strength testing Effort dependant, giveaway weakness noted            Deltoid      Biceps      Triceps     Wrist Extension    Wrist Flexion     Interossei         R            5                 5               5                     5                              5                        5                 5  L             5                 5               5                     5                              5                        5                 5              Hip Flexion    Hip Extension    Knee Flexion    Knee Extension    Dorsiflexion    Plantar Flexion  R              5                           5                       5                           5                            5                          5  L              5                           5                        5                           5                            5                          5    Sensation - Light touch intact throughout    Coordination - Finger to Nose intact b/l. No tremors appreciated    Gait and station - Deffered due to fall/safety risk     Labs:          CAPILLARY BLOOD GLUCOSE                  Radiology:     Neurology - Consult Note    -  Spectra: [phone] ([facility]), [phone] ([facility])  -    HPI: Patient RALPH BATES is a 48y (1975) woman with unclear PMH presenting as code stroke for decreased verbal output. 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 Her eyes then rolled back and she was foaming out of her mouth. Co-workers were near by and able to catch her before her fall. No shaking of extremities, incontinence, tongue biting, weakness, numbness, headache, neck pain, recent fever, chills, previous head trauma, Hx seizures. Her eyes were rolled back form the time she was in the endoscopy suite to the ED, over 15 minutes. Patient does not recall event of earlier today initally. After CT scan she recall feeling lightheaded and pain to left eye. At baseline, patient is independent and ambulates without assistance.     NIHSS 1  mRS 0   LKN 2:40 pm 12/22    Review of Systems: All other review of systems is negative unless indicated above.    Allergies:  No Known Allergies      PMHx/PSHx/Family Hx: As above, otherwise see below   No pertinent past medical history    History of abnormal Pap smear        Social Hx:  No current use of tobacco, alcohol, or illicit drugs      Medications:  MEDICATIONS  (STANDING):    MEDICATIONS  (PRN):      Vitals:  T(C): --  HR: --  BP: --  RR: --  SpO2: --    Physical Examination:   General - NAD      Neurologic Exam:  Mental status - Awake, Alert, Oriented to person, place, and time. Speech clear, fluent, repetition and naming intact. Follows simple and complex commands.   Attention/concentration, know current president, quarters in $1, says 5 quarters in a $1.75    Cranial nerves - PERRL, BTT b/l, EOMI, face sensation (V1-V3) intact b/l, facial strength intact without asymmetry b/l, hearing intact b/l, palate with symmetric elevation, trapezius 5/5 strength b/l, tongue midline on protrusion with full lateral movement    Motor - Normal bulk and tone throughout. No pronator drift.  Strength testing Effort dependant, giveaway weakness noted            Deltoid      Biceps      Triceps     Wrist Extension    Wrist Flexion     Interossei         R            5                 5               5                     5                              5                        5                 5  L             5                 5               5                     5                              5                        5                 5              Hip Flexion    Hip Extension    Knee Flexion    Knee Extension    Dorsiflexion    Plantar Flexion  R              5                           5                       5                           5                            5                          5  L              5                           5                        5                           5                            5                          5    Sensation - Light touch intact throughout    Coordination - Finger to Nose intact b/l. No tremors appreciated    Gait and station - Deffered due to fall/safety risk     Labs:          CAPILLARY BLOOD GLUCOSE                  Radiology:     Neurology - Consult Note    -  Spectra: [phone] ([facility]), [phone] ([facility])  -    HPI: Patient RALPH BATES is a 48y (1975) woman with unclear PMH presenting as code stroke for decreased verbal output. 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 Her eyes then rolled back and she was foaming out of her mouth. Co-workers were near by and able to catch her before her fall. No shaking of extremities, incontinence, tongue biting, weakness, numbness, headache, neck pain, recent fever, chills, previous head trauma, Hx seizures. Her eyes were rolled back form the time she was in the endoscopy suite to the ED, over 15 minutes. Patient does not recall event of earlier today initally. After CT scan she recall feeling lightheaded and pain to left eye. At baseline, patient is independent and ambulates without assistance.     NIHSS 1  mRS 0   LKN 2:40 pm 12/22    Review of Systems: All other review of systems is negative unless indicated above.    Allergies:  No Known Allergies      PMHx/PSHx/Family Hx: As above, otherwise see below   No pertinent past medical history    History of abnormal Pap smear        Social Hx:  No current use of tobacco, alcohol, or illicit drugs      Medications:  MEDICATIONS  (STANDING):    MEDICATIONS  (PRN):      Vitals:  T(C): --  HR: --  BP: --  RR: --  SpO2: --    Physical Examination:   General - NAD      Neurologic Exam:  Mental status - Awake, Alert, Oriented to person, place, and time. Speech clear, fluent, repetition and naming intact. Follows simple and complex commands.   Attention/concentration, know current president, quarters in $1, says 5 quarters in a $1.75    Cranial nerves - PERRL, BTT b/l, EOMI, face sensation (V1-V3) intact b/l, facial strength intact without asymmetry b/l, hearing intact b/l, palate with symmetric elevation, trapezius 5/5 strength b/l, tongue midline on protrusion with full lateral movement    Motor - Normal bulk and tone throughout. No pronator drift.  Strength testing Effort dependant, giveaway weakness noted            Deltoid      Biceps      Triceps     Wrist Extension    Wrist Flexion     Interossei         R            5                 5               5                     5                              5                        5                 5  L             5                 5               5                     5                              5                        5                 5              Hip Flexion    Hip Extension    Knee Flexion    Knee Extension    Dorsiflexion    Plantar Flexion  R              5                           5                       5                           5                            5                          5  L              5                           5                        5                           5                            5                          5    Sensation - Light touch intact throughout    Coordination - Finger to Nose intact b/l. No tremors appreciated    Gait and station - Deffered due to fall/safety risk     Labs:          CAPILLARY BLOOD GLUCOSE                  Radiology:  < from: CT Angio Neck Stroke Protocol w/ IV Cont (12.22.23 @ 15:34) >  HEAD CT AND RAPID PERFUSION:    HEAD CT: Mild volume loss, microvascular disease, no acute hemorrhage or   midline shift.    CT PERFUSION demonstrated: Tmax greater then 6 seconds in the high   frontal region slightly left and in the posterior fossa at the level of   the brachium pontis bilaterally. Unclear if this is artifactual    If symptoms persist consider follow up head CT or MRI, MRAif no   contraindication.    CTA COW:  Patent intracranial circulation without flow limiting stenosis    CTA NECK: Patent, ECAs, ICAs, no  hemodynamically significant stenosis at    ICA origins by NASCET criteria.  Bilateral vertebral arteries are patent without flow limiting stenosis.    < end of copied text >     Neurology - Consult Note    -  Spectra: [phone] ([facility]), [phone] ([facility])  -    HPI: Patient RALPH BATES is a 48y (1975) woman with unclear PMH presenting as code stroke for decreased verbal output. 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 Her eyes then rolled back and she was foaming out of her mouth. Co-workers were near by and able to catch her before her fall. No shaking of extremities, incontinence, tongue biting, weakness, numbness, headache, neck pain, recent fever, chills, previous head trauma, Hx seizures. Her eyes were rolled back form the time she was in the endoscopy suite to the ED, over 15 minutes. Patient does not recall event of earlier today initally. After CT scan she recall feeling lightheaded and pain to left eye. At baseline, patient is independent and ambulates without assistance.     NIHSS 1  mRS 0   LKN 2:40 pm 12/22    Review of Systems: All other review of systems is negative unless indicated above.    Allergies:  No Known Allergies      PMHx/PSHx/Family Hx: As above, otherwise see below   No pertinent past medical history    History of abnormal Pap smear        Social Hx:  No current use of tobacco, alcohol, or illicit drugs      Medications:  MEDICATIONS  (STANDING):    MEDICATIONS  (PRN):      Vitals:  T(C): --  HR: --  BP: --  RR: --  SpO2: --    Physical Examination:   General - NAD      Neurologic Exam:  Mental status - Awake, Alert, Oriented to person, place, and time. Speech clear, fluent, repetition and naming intact. Follows simple and complex commands.   Attention/concentration, know current president, quarters in $1, says 5 quarters in a $1.75    Cranial nerves - PERRL, BTT b/l, EOMI, face sensation (V1-V3) intact b/l, facial strength intact without asymmetry b/l, hearing intact b/l, palate with symmetric elevation, trapezius 5/5 strength b/l, tongue midline on protrusion with full lateral movement    Motor - Normal bulk and tone throughout. No pronator drift.  Strength testing Effort dependant, giveaway weakness noted            Deltoid      Biceps      Triceps     Wrist Extension    Wrist Flexion     Interossei         R            5                 5               5                     5                              5                        5                 5  L             5                 5               5                     5                              5                        5                 5              Hip Flexion    Hip Extension    Knee Flexion    Knee Extension    Dorsiflexion    Plantar Flexion  R              5                           5                       5                           5                            5                          5  L              5                           5                        5                           5                            5                          5    Sensation - Light touch intact throughout    Coordination - Finger to Nose intact b/l. No tremors appreciated    Gait and station - Deffered due to fall/safety risk     Labs:          CAPILLARY BLOOD GLUCOSE                  Radiology:  < from: CT Angio Neck Stroke Protocol w/ IV Cont (12.22.23 @ 15:34) >  HEAD CT AND RAPID PERFUSION:    HEAD CT: Mild volume loss, microvascular disease, no acute hemorrhage or   midline shift.    CT PERFUSION demonstrated: Tmax greater then 6 seconds in the high   frontal region slightly left and in the posterior fossa at the level of   the brachium pontis bilaterally. Unclear if this is artifactual    If symptoms persist consider follow up head CT or MRI, MRAif no   contraindication.    CTA COW:  Patent intracranial circulation without flow limiting stenosis    CTA NECK: Patent, ECAs, ICAs, no  hemodynamically significant stenosis at    ICA origins by NASCET criteria.  Bilateral vertebral arteries are patent without flow limiting stenosis.    < end of copied text >

## 2023-12-22 NOTE — CONSULT NOTE ADULT - ASSESSMENT
48y (1975) woman with unclear PMH presenting as code stroke for decreased verbal output. 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 Her eyes then rolled back and she was foaming out of her mouth. No shaking of extremities, incontinence, tongue biting, weakness, numbness, headache, neck pain. Her eyes were rolled back form the time she was in the endoscopt suite to the ED, over 15 minutes. Patient does not recall event of earlier today. At baseline, patient is independent and ambulates without assistance. On exam, patient mild non fluent speech.     Impression:    Recommendation:     48y (1975) woman with unclear PMH presenting as code stroke for decreased verbal output. 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 Her eyes then rolled back and she was foaming out of her mouth. No shaking of extremities, incontinence, tongue biting, weakness, numbness, headache, neck pain. Her eyes were rolled back form the time she was in the endoscope suite to the ED, over 15 minutes. Patient does not recall event of earlier today. At baseline, patient is independent and ambulates without assistance. On exam, patient mild non fluent speech.     Impression: Decreased responsiveness with decreased verbal output due to toxic, metabolic infectious etiology vs seizure vs syncope      Recommendation:   [] Follow finals CTs    48y (1975) woman with unclear PMH presenting as code stroke for decreased verbal output. 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 Her eyes then rolled back and she was foaming out of her mouth. No shaking of extremities, incontinence, tongue biting, weakness, numbness, headache, neck pain. Her eyes were rolled back form the time she was in the endoscope suite to the ED, over 15 minutes. Patient does not recall event of earlier today. At baseline, patient is independent and ambulates without assistance. On exam, patient mild non fluent speech.     Not Tenecteplase candidate due to no focal deficits  Not thrombectomy candidate due to no LVO    Impression: Decreased responsiveness with decreased verbal output due to toxic, metabolic infectious etiology vs syncope vs seizure    Recommendation:   [] Follow finals CTs      48y (1975) woman with unclear PMH presenting as code stroke for decreased verbal output. 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 Her eyes then rolled back and she was foaming out of her mouth. No shaking of extremities, incontinence, tongue biting, weakness, numbness, headache, neck pain. Her eyes were rolled back form the time she was in the endoscope suite to the ED, over 15 minutes. Patient does not recall event of earlier today. At baseline, patient is independent and ambulates without assistance. On exam, patient mild non fluent speech.     Not Tenecteplase candidate due to no focal deficits  Not thrombectomy candidate due to no LVO    Impression: Decreased responsiveness with decreased verbal output due to toxic, metabolic or infectious etiology vs seizure     Recommendation:   [] Follow finals CTs  [] No AEDs at this time  [] vEEG   [] UA  [] Utox, alcohol level     Other:   [] Telemetry monitoring; Neurochecks/VS per unit protocol  [] Seizure, fall and aspiration precautions  [] Given concern for seizure, advise patient to not drive, operate heavy machinery, avoid heights, pools, bathtubs, locked doors until cleared by further follow up outpatient by neurology.     Case discussed with stroke fellow Dr. Cronin undersupervision of stroke attending Dr. Ambrose.       48y (1975) woman with unclear PMH presenting as code stroke for decreased verbal output. 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 Her eyes then rolled back and she was foaming out of her mouth. No shaking of extremities, incontinence, tongue biting, weakness, numbness, headache, neck pain. Her eyes were rolled back form the time she was in the endoscope suite to the ED, over 15 minutes. Patient does not recall event of earlier today. At baseline, patient is independent and ambulates without assistance. On exam, patient mild non fluent speech.     Not Tenecteplase candidate due to no focal deficits  Not thrombectomy candidate due to no LVO    Impression: Decreased responsiveness with decreased verbal output due to toxic, metabolic or infectious etiology vs seizure     Recommendation:   [] Follow finals CTs  [] No AEDs at this time  [] vEEG   [] UA  [] Utox, alcohol level     Other:   [] Telemetry monitoring; Neurochecks/VS per unit protocol  [] Seizure, fall and aspiration precautions  [] Given concern for seizure, advise patient to not drive, operate heavy machinery, avoid heights, pools, bathtubs, locked doors until cleared by further follow up outpatient by neurology.     Case discussed with stroke fellow Dr. Cronin undersupervision of stroke attending Dr. Abmrose.       48y (1975) woman with unclear PMH presenting as code stroke for decreased verbal output. 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 Her eyes then rolled back and she was foaming out of her mouth. No shaking of extremities, incontinence, tongue biting, weakness, numbness, headache, neck pain. Her eyes were rolled back form the time she was in the endoscope suite to the ED, over 15 minutes. Patient does not recall event of earlier today. At baseline, patient is independent and ambulates without assistance. On exam, patient mild non fluent speech. CTH no acute findings, CTA no  hemodynamically significant stenosis, CTP  Tmax greater then 6 seconds in the high frontal region slightly left and in the posterior fossa at the level of the brachium pontis bilaterally likely artifact.     Not Tenecteplase candidate due to no focal deficits  Not thrombectomy candidate due to no LVO    Impression: Decreased responsiveness with decreased verbal output due to toxic, metabolic or infectious etiology vs seizure     Recommendation:   [] No antiseizure medications at this time  [] vEEG   [] Basic infectious workup   [] Utox, alcohol level     Other:   [] Telemetry monitoring; Neurochecks/VS per unit protocol  [] Seizure, fall and aspiration precautions  [] Given concern for seizure, advise patient to not drive, operate heavy machinery, avoid heights, pools, bathtubs, locked doors until cleared by further follow up outpatient by neurology.     Case discussed with stroke fellow Dr. Cronin undersupervision of stroke attending Dr. Ambrose.

## 2023-12-22 NOTE — H&P ADULT - NSHPLABSRESULTS_GEN_ALL_CORE
10.3   6.38  )-----------( 343      ( 22 Dec 2023 15:13 )             34.0     139  |  102  |  13  ----------------------------<  95     12-22  3.6   |  16<L>  |  0.54    Ca    9.2      22 Dec 2023 15:13    TPro  7.7  /  Alb  3.8  /  TBili  0.2  /  DBili  x   /  AST  18  /  ALT  11  /  AlkPhos  83  12-22    PT/INR: 12.2/1.09 (12-22-23 @ 15:13)  PTT: 31.3 (12-22-23 @ 15:13)    hs Troponin, T - 6 ng/L (12-22-23 @ 15:13)    EKG: ordered as not in chart   Images interpreted by radiology:     HEAD CT AND RAPID PERFUSION:    HEAD CT: Mild volume loss, microvascular disease, no acute hemorrhage or midline shift.    CT PERFUSION demonstrated: Tmax greater then 6 seconds in the high frontal region slightly left and in the posterior fossa at the level of the brachium pontis bilaterally. Unclear if this is artifactual  If symptoms persist consider follow up head CT or MRI, MRA if no contraindication.    CTA COW: Patent intracranial circulation without flow limiting stenosis    CTA NECK: Patent, ECAs, ICAs, no hemodynamically significant stenosis at ICA origins by NASCET criteria.  Bilateral vertebral arteries are patent without flow limiting stenosis.

## 2023-12-22 NOTE — H&P ADULT - HISTORY OF PRESENT ILLNESS
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 Her eyes then rolled back and she was foaming out of her mouth. Co-workers were near by and able to catch her before her fall." Duration of even ~15 minutes per chart review.  Pt reports only feeling tired prior to the event but no other prodromal symptoms.   Denies  headache, dizziness, chest pain, palpitations, SOB, abdominal pain, joint pain, diarrhea/constipation, urinary symptoms.   Vitals: T 98, HR 70, /67, RR 20 satting 100% RA

## 2023-12-22 NOTE — CONSULT NOTE ADULT - ATTENDING COMMENTS
How Severe Is Your Condition?: severe She is still sleepy and slow to respond but improved compared to initial presentation.     Mental status: Awake, slow to answer and oriented to person, time and place, Language intact, Fund of knowledge intact.   No hemineglect.    Cranial Nerves   II: VFF    III, IV, VI: PERRL, EOMI.     VII: Facial strength is normal B/L.   VIII: Gross hearing is intact.     IX, X: Palate is midline and elevates symmetrically.     XI: Trapezius normal strength.     XII: Tongue midline without atrophy or fasciculations.     Motor exam    Muscle tone - no evidence of rigidity or resistance in all 4 extremities.    No atrophy or fasciculations   Muscle Strength: arms and legs, proximal and distal flexors and extensors with poor variable effort but at least 4+ throughout.  No focal weakness. No pronator drift.       Reflexes   All present, normal, and symmetrical.     Plantars right: mute.     Plantars left: mute.       Coordination   Finger to nose: Normal.    Heel to shin: Normal.         < from: CT Angio Neck Stroke Protocol w/ IV Cont (12.22.23 @ 15:34) >    HEAD CT: Mild volume loss, microvascular disease, no acute hemorrhage or   midline shift.    CT PERFUSION demonstrated: Tmax greater then 6 seconds in the high   frontal region slightly left and in the posterior fossa at the level of   the brachium pontis bilaterally. Unclear if this is artifactual    If symptoms persist consider follow up head CT or MRI, MRAif no   contraindication.    CTA COW:  Patent intracranial circulation without flow limiting stenosis    CTA NECK: Patent, ECAs, ICAs, no  hemodynamically significant stenosis at    ICA origins by NASCET criteria.  Bilateral vertebral arteries are patent without flow limiting stenosis.    A/P  Ms. Bowman is a 47 yo woman with sudden onset unresponsiveness with persistent mild encephalopathy - improving clinically.   The differential diagnosis includes: Seizure, other CNS lesion, toxic metabolic septic encephalopathy  Continuous EEG  MRI brain w/wo gado.  D/W patient and neurology resident.   Thank you

## 2023-12-22 NOTE — ED ADULT NURSE NOTE - OBJECTIVE STATEMENT
Pt arrives into room 18 accompanied by endoscopy staff and anesthesiologist along with rapid response team. Pt staff pt appeared to have a seizure and became unresponsive. Pt arrives with 20 g iv to right arm fluids infusing, Keppra given . Pt awake but lethargic, intermittently following some commands but aphagic. Pt then noted to become more  alert answering question more appropriately and following commands during neurology evaluation.  fs 84 upon arrival. pt with  no nausea denies headache or dizziness, placed on cardiac monitor vs wnl. ICU nurse now at bedside along with stroke team. awaiting ct results

## 2023-12-22 NOTE — ED PROVIDER NOTE - OBJECTIVE STATEMENT
48-year-old female presenting with concern for seizure. Patient works in the hospital. Patient had a witnessed episode of eyes rolling back, foaming at the mouth and going unconscious. Coworker caught her and lowered her to the ground. Patient did not have tonic-clonic activity. After the event patient was slow to return to baseline. Patient was a rapid response brought down to the emergency department. Patient currently denying chest pain, difficulty breathing, headaches, nausea, vomiting, abdominal pain. 48-year-old female presenting with concern for seizure. Patient works in the hospital. Patient had a witnessed episode of eyes rolling back, foaming at the mouth and going unconscious. Coworker caught her and lowered her to the ground. Patient did not have tonic-clonic activity. After the event patient was slow to return to baseline. Patient was a rapid response brought down to the emergency department. Patient currently denying chest pain, difficulty breathing, headaches, nausea, vomiting, abdominal pain.    Attendinyo female presents with change in mental status.  pt was a rapid response in endoscopy suite.  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  no tonic-clonic activity.  pt slow to respond and not speaking when she arrived to the ED but was awake.

## 2023-12-22 NOTE — H&P ADULT - PROBLEM SELECTOR PLAN 1
New  "CTP  Tmax greater then 6 seconds in the high frontal region slightly left and in the posterior fossa at the level of the brachium pontis bilaterally likely artifact. "  s/p Keppra 1g IV x1   Neurology consulted: No antiseizure medications at this time, vEEG,  Basic infectious workup, Utox, alcohol level, Seizure, fall and aspiration precautions

## 2023-12-23 DIAGNOSIS — R56.9 UNSPECIFIED CONVULSIONS: ICD-10-CM

## 2023-12-23 LAB
24R-OH-CALCIDIOL SERPL-MCNC: 57 NG/ML — SIGNIFICANT CHANGE UP (ref 30–80)
24R-OH-CALCIDIOL SERPL-MCNC: 57 NG/ML — SIGNIFICANT CHANGE UP (ref 30–80)
A1C WITH ESTIMATED AVERAGE GLUCOSE RESULT: 5.1 % — SIGNIFICANT CHANGE UP (ref 4–5.6)
A1C WITH ESTIMATED AVERAGE GLUCOSE RESULT: 5.1 % — SIGNIFICANT CHANGE UP (ref 4–5.6)
AMPHET UR-MCNC: NEGATIVE — SIGNIFICANT CHANGE UP
AMPHET UR-MCNC: NEGATIVE — SIGNIFICANT CHANGE UP
ANION GAP SERPL CALC-SCNC: 10 MMOL/L — SIGNIFICANT CHANGE UP (ref 7–14)
ANION GAP SERPL CALC-SCNC: 10 MMOL/L — SIGNIFICANT CHANGE UP (ref 7–14)
APPEARANCE UR: ABNORMAL
APPEARANCE UR: ABNORMAL
BACTERIA # UR AUTO: ABNORMAL /HPF
BACTERIA # UR AUTO: ABNORMAL /HPF
BARBITURATES UR SCN-MCNC: NEGATIVE — SIGNIFICANT CHANGE UP
BARBITURATES UR SCN-MCNC: NEGATIVE — SIGNIFICANT CHANGE UP
BASOPHILS # BLD AUTO: 0.03 K/UL — SIGNIFICANT CHANGE UP (ref 0–0.2)
BASOPHILS # BLD AUTO: 0.03 K/UL — SIGNIFICANT CHANGE UP (ref 0–0.2)
BASOPHILS NFR BLD AUTO: 0.7 % — SIGNIFICANT CHANGE UP (ref 0–2)
BASOPHILS NFR BLD AUTO: 0.7 % — SIGNIFICANT CHANGE UP (ref 0–2)
BENZODIAZ UR-MCNC: NEGATIVE — SIGNIFICANT CHANGE UP
BENZODIAZ UR-MCNC: NEGATIVE — SIGNIFICANT CHANGE UP
BILIRUB UR-MCNC: ABNORMAL
BILIRUB UR-MCNC: ABNORMAL
BUN SERPL-MCNC: 9 MG/DL — SIGNIFICANT CHANGE UP (ref 7–23)
BUN SERPL-MCNC: 9 MG/DL — SIGNIFICANT CHANGE UP (ref 7–23)
CALCIUM SERPL-MCNC: 8.6 MG/DL — SIGNIFICANT CHANGE UP (ref 8.4–10.5)
CALCIUM SERPL-MCNC: 8.6 MG/DL — SIGNIFICANT CHANGE UP (ref 8.4–10.5)
CAST: 0 /LPF — SIGNIFICANT CHANGE UP (ref 0–4)
CAST: 0 /LPF — SIGNIFICANT CHANGE UP (ref 0–4)
CHLORIDE SERPL-SCNC: 107 MMOL/L — SIGNIFICANT CHANGE UP (ref 98–107)
CHLORIDE SERPL-SCNC: 107 MMOL/L — SIGNIFICANT CHANGE UP (ref 98–107)
CHOLEST SERPL-MCNC: 130 MG/DL — SIGNIFICANT CHANGE UP
CHOLEST SERPL-MCNC: 130 MG/DL — SIGNIFICANT CHANGE UP
CO2 SERPL-SCNC: 23 MMOL/L — SIGNIFICANT CHANGE UP (ref 22–31)
CO2 SERPL-SCNC: 23 MMOL/L — SIGNIFICANT CHANGE UP (ref 22–31)
COCAINE METAB.OTHER UR-MCNC: NEGATIVE — SIGNIFICANT CHANGE UP
COCAINE METAB.OTHER UR-MCNC: NEGATIVE — SIGNIFICANT CHANGE UP
COLOR SPEC: ABNORMAL
COLOR SPEC: ABNORMAL
CREAT SERPL-MCNC: 0.61 MG/DL — SIGNIFICANT CHANGE UP (ref 0.5–1.3)
CREAT SERPL-MCNC: 0.61 MG/DL — SIGNIFICANT CHANGE UP (ref 0.5–1.3)
CREATININE URINE RESULT, DAU: 88 MG/DL — SIGNIFICANT CHANGE UP
CREATININE URINE RESULT, DAU: 88 MG/DL — SIGNIFICANT CHANGE UP
DIFF PNL FLD: ABNORMAL
DIFF PNL FLD: ABNORMAL
EGFR: 110 ML/MIN/1.73M2 — SIGNIFICANT CHANGE UP
EGFR: 110 ML/MIN/1.73M2 — SIGNIFICANT CHANGE UP
EOSINOPHIL # BLD AUTO: 0 K/UL — SIGNIFICANT CHANGE UP (ref 0–0.5)
EOSINOPHIL # BLD AUTO: 0 K/UL — SIGNIFICANT CHANGE UP (ref 0–0.5)
EOSINOPHIL NFR BLD AUTO: 0 % — SIGNIFICANT CHANGE UP (ref 0–6)
EOSINOPHIL NFR BLD AUTO: 0 % — SIGNIFICANT CHANGE UP (ref 0–6)
ESTIMATED AVERAGE GLUCOSE: 100 — SIGNIFICANT CHANGE UP
ESTIMATED AVERAGE GLUCOSE: 100 — SIGNIFICANT CHANGE UP
FERRITIN SERPL-MCNC: 15 NG/ML — SIGNIFICANT CHANGE UP (ref 15–150)
FERRITIN SERPL-MCNC: 15 NG/ML — SIGNIFICANT CHANGE UP (ref 15–150)
GLUCOSE SERPL-MCNC: 81 MG/DL — SIGNIFICANT CHANGE UP (ref 70–99)
GLUCOSE SERPL-MCNC: 81 MG/DL — SIGNIFICANT CHANGE UP (ref 70–99)
GLUCOSE UR QL: NEGATIVE MG/DL — SIGNIFICANT CHANGE UP
GLUCOSE UR QL: NEGATIVE MG/DL — SIGNIFICANT CHANGE UP
HCT VFR BLD CALC: 32.7 % — LOW (ref 34.5–45)
HCT VFR BLD CALC: 32.7 % — LOW (ref 34.5–45)
HDLC SERPL-MCNC: 49 MG/DL — LOW
HDLC SERPL-MCNC: 49 MG/DL — LOW
HGB BLD-MCNC: 10.2 G/DL — LOW (ref 11.5–15.5)
HGB BLD-MCNC: 10.2 G/DL — LOW (ref 11.5–15.5)
IANC: 2.7 K/UL — SIGNIFICANT CHANGE UP (ref 1.8–7.4)
IANC: 2.7 K/UL — SIGNIFICANT CHANGE UP (ref 1.8–7.4)
IMM GRANULOCYTES NFR BLD AUTO: 0.2 % — SIGNIFICANT CHANGE UP (ref 0–0.9)
IMM GRANULOCYTES NFR BLD AUTO: 0.2 % — SIGNIFICANT CHANGE UP (ref 0–0.9)
IRON SATN MFR SERPL: 20 % — SIGNIFICANT CHANGE UP (ref 14–50)
IRON SATN MFR SERPL: 20 % — SIGNIFICANT CHANGE UP (ref 14–50)
IRON SATN MFR SERPL: 65 UG/DL — SIGNIFICANT CHANGE UP (ref 30–160)
IRON SATN MFR SERPL: 65 UG/DL — SIGNIFICANT CHANGE UP (ref 30–160)
KETONES UR-MCNC: NEGATIVE MG/DL — SIGNIFICANT CHANGE UP
KETONES UR-MCNC: NEGATIVE MG/DL — SIGNIFICANT CHANGE UP
LEUKOCYTE ESTERASE UR-ACNC: ABNORMAL
LEUKOCYTE ESTERASE UR-ACNC: ABNORMAL
LIPID PNL WITH DIRECT LDL SERPL: 76 MG/DL — SIGNIFICANT CHANGE UP
LIPID PNL WITH DIRECT LDL SERPL: 76 MG/DL — SIGNIFICANT CHANGE UP
LYMPHOCYTES # BLD AUTO: 1.19 K/UL — SIGNIFICANT CHANGE UP (ref 1–3.3)
LYMPHOCYTES # BLD AUTO: 1.19 K/UL — SIGNIFICANT CHANGE UP (ref 1–3.3)
LYMPHOCYTES # BLD AUTO: 28.3 % — SIGNIFICANT CHANGE UP (ref 13–44)
LYMPHOCYTES # BLD AUTO: 28.3 % — SIGNIFICANT CHANGE UP (ref 13–44)
MCHC RBC-ENTMCNC: 25.4 PG — LOW (ref 27–34)
MCHC RBC-ENTMCNC: 25.4 PG — LOW (ref 27–34)
MCHC RBC-ENTMCNC: 31.2 GM/DL — LOW (ref 32–36)
MCHC RBC-ENTMCNC: 31.2 GM/DL — LOW (ref 32–36)
MCV RBC AUTO: 81.5 FL — SIGNIFICANT CHANGE UP (ref 80–100)
MCV RBC AUTO: 81.5 FL — SIGNIFICANT CHANGE UP (ref 80–100)
METHADONE UR-MCNC: NEGATIVE — SIGNIFICANT CHANGE UP
METHADONE UR-MCNC: NEGATIVE — SIGNIFICANT CHANGE UP
MONOCYTES # BLD AUTO: 0.28 K/UL — SIGNIFICANT CHANGE UP (ref 0–0.9)
MONOCYTES # BLD AUTO: 0.28 K/UL — SIGNIFICANT CHANGE UP (ref 0–0.9)
MONOCYTES NFR BLD AUTO: 6.7 % — SIGNIFICANT CHANGE UP (ref 2–14)
MONOCYTES NFR BLD AUTO: 6.7 % — SIGNIFICANT CHANGE UP (ref 2–14)
NEUTROPHILS # BLD AUTO: 2.7 K/UL — SIGNIFICANT CHANGE UP (ref 1.8–7.4)
NEUTROPHILS # BLD AUTO: 2.7 K/UL — SIGNIFICANT CHANGE UP (ref 1.8–7.4)
NEUTROPHILS NFR BLD AUTO: 64.1 % — SIGNIFICANT CHANGE UP (ref 43–77)
NEUTROPHILS NFR BLD AUTO: 64.1 % — SIGNIFICANT CHANGE UP (ref 43–77)
NITRITE UR-MCNC: POSITIVE
NITRITE UR-MCNC: POSITIVE
NON HDL CHOLESTEROL: 81 MG/DL — SIGNIFICANT CHANGE UP
NON HDL CHOLESTEROL: 81 MG/DL — SIGNIFICANT CHANGE UP
NRBC # BLD: 0 /100 WBCS — SIGNIFICANT CHANGE UP (ref 0–0)
NRBC # BLD: 0 /100 WBCS — SIGNIFICANT CHANGE UP (ref 0–0)
NRBC # FLD: 0 K/UL — SIGNIFICANT CHANGE UP (ref 0–0)
NRBC # FLD: 0 K/UL — SIGNIFICANT CHANGE UP (ref 0–0)
OPIATES UR-MCNC: NEGATIVE — SIGNIFICANT CHANGE UP
OPIATES UR-MCNC: NEGATIVE — SIGNIFICANT CHANGE UP
OXYCODONE UR-MCNC: NEGATIVE — SIGNIFICANT CHANGE UP
OXYCODONE UR-MCNC: NEGATIVE — SIGNIFICANT CHANGE UP
PCP SPEC-MCNC: SIGNIFICANT CHANGE UP
PCP SPEC-MCNC: SIGNIFICANT CHANGE UP
PCP UR-MCNC: NEGATIVE — SIGNIFICANT CHANGE UP
PCP UR-MCNC: NEGATIVE — SIGNIFICANT CHANGE UP
PH UR: 6.5 — SIGNIFICANT CHANGE UP (ref 5–8)
PH UR: 6.5 — SIGNIFICANT CHANGE UP (ref 5–8)
PLATELET # BLD AUTO: 337 K/UL — SIGNIFICANT CHANGE UP (ref 150–400)
PLATELET # BLD AUTO: 337 K/UL — SIGNIFICANT CHANGE UP (ref 150–400)
POTASSIUM SERPL-MCNC: 3.6 MMOL/L — SIGNIFICANT CHANGE UP (ref 3.5–5.3)
POTASSIUM SERPL-MCNC: 3.6 MMOL/L — SIGNIFICANT CHANGE UP (ref 3.5–5.3)
POTASSIUM SERPL-SCNC: 3.6 MMOL/L — SIGNIFICANT CHANGE UP (ref 3.5–5.3)
POTASSIUM SERPL-SCNC: 3.6 MMOL/L — SIGNIFICANT CHANGE UP (ref 3.5–5.3)
PROT UR-MCNC: 100 MG/DL
PROT UR-MCNC: 100 MG/DL
RBC # BLD: 4.01 M/UL — SIGNIFICANT CHANGE UP (ref 3.8–5.2)
RBC # BLD: 4.01 M/UL — SIGNIFICANT CHANGE UP (ref 3.8–5.2)
RBC # FLD: 15.9 % — HIGH (ref 10.3–14.5)
RBC # FLD: 15.9 % — HIGH (ref 10.3–14.5)
RBC CASTS # UR COMP ASSIST: 5266 /HPF — HIGH (ref 0–4)
RBC CASTS # UR COMP ASSIST: 5266 /HPF — HIGH (ref 0–4)
SODIUM SERPL-SCNC: 140 MMOL/L — SIGNIFICANT CHANGE UP (ref 135–145)
SODIUM SERPL-SCNC: 140 MMOL/L — SIGNIFICANT CHANGE UP (ref 135–145)
SP GR SPEC: 1.03 — SIGNIFICANT CHANGE UP (ref 1–1.03)
SP GR SPEC: 1.03 — SIGNIFICANT CHANGE UP (ref 1–1.03)
SQUAMOUS # UR AUTO: 1 /HPF — SIGNIFICANT CHANGE UP (ref 0–5)
SQUAMOUS # UR AUTO: 1 /HPF — SIGNIFICANT CHANGE UP (ref 0–5)
THC UR QL: NEGATIVE — SIGNIFICANT CHANGE UP
THC UR QL: NEGATIVE — SIGNIFICANT CHANGE UP
TIBC SERPL-MCNC: 332 UG/DL — SIGNIFICANT CHANGE UP (ref 220–430)
TIBC SERPL-MCNC: 332 UG/DL — SIGNIFICANT CHANGE UP (ref 220–430)
TRIGL SERPL-MCNC: 27 MG/DL — SIGNIFICANT CHANGE UP
TRIGL SERPL-MCNC: 27 MG/DL — SIGNIFICANT CHANGE UP
TSH SERPL-MCNC: 0.88 UIU/ML — SIGNIFICANT CHANGE UP (ref 0.27–4.2)
TSH SERPL-MCNC: 0.88 UIU/ML — SIGNIFICANT CHANGE UP (ref 0.27–4.2)
UIBC SERPL-MCNC: 267 UG/DL — SIGNIFICANT CHANGE UP (ref 110–370)
UIBC SERPL-MCNC: 267 UG/DL — SIGNIFICANT CHANGE UP (ref 110–370)
UROBILINOGEN FLD QL: 1 MG/DL — SIGNIFICANT CHANGE UP (ref 0.2–1)
UROBILINOGEN FLD QL: 1 MG/DL — SIGNIFICANT CHANGE UP (ref 0.2–1)
VIT B12 SERPL-MCNC: 956 PG/ML — HIGH (ref 200–900)
VIT B12 SERPL-MCNC: 956 PG/ML — HIGH (ref 200–900)
WBC # BLD: 4.21 K/UL — SIGNIFICANT CHANGE UP (ref 3.8–10.5)
WBC # BLD: 4.21 K/UL — SIGNIFICANT CHANGE UP (ref 3.8–10.5)
WBC # FLD AUTO: 4.21 K/UL — SIGNIFICANT CHANGE UP (ref 3.8–10.5)
WBC # FLD AUTO: 4.21 K/UL — SIGNIFICANT CHANGE UP (ref 3.8–10.5)
WBC UR QL: 47 /HPF — HIGH (ref 0–5)
WBC UR QL: 47 /HPF — HIGH (ref 0–5)

## 2023-12-23 PROCEDURE — 99232 SBSQ HOSP IP/OBS MODERATE 35: CPT

## 2023-12-23 PROCEDURE — 99231 SBSQ HOSP IP/OBS SF/LOW 25: CPT

## 2023-12-23 NOTE — PROGRESS NOTE ADULT - SUBJECTIVE AND OBJECTIVE BOX
INCOMPLETE     INTERVAL HISTORY:    PAST MEDICAL & SURGICAL HISTORY:  History of abnormal Pap smear      S/P cone biopsy of cervix  1/19        FAMILY HISTORY:  No pertinent family history in first degree relatives      SOCIAL HISTORY:   T/E/D:   Occupation:   Lives with:     MEDICATIONS (HOME):  Home Medications:    MEDICATIONS  (STANDING):  enoxaparin Injectable 40 milliGRAM(s) SubCutaneous every 24 hours    MEDICATIONS  (PRN):  acetaminophen     Tablet .. 650 milliGRAM(s) Oral every 6 hours PRN Temp greater or equal to 38C (100.4F), Mild Pain (1 - 3)  aluminum hydroxide/magnesium hydroxide/simethicone Suspension 30 milliLiter(s) Oral every 4 hours PRN Dyspepsia  melatonin 3 milliGRAM(s) Oral at bedtime PRN Insomnia  ondansetron Injectable 4 milliGRAM(s) IV Push every 8 hours PRN Nausea and/or Vomiting    ALLERGIES/INTOLERANCES:  Allergies  No Known Allergies    Intolerances    VITALS & EXAMINATION:  Vital Signs Last 24 Hrs  T(C): 36.8 (23 Dec 2023 08:30), Max: 37.1 (23 Dec 2023 01:18)  T(F): 98.3 (23 Dec 2023 08:30), Max: 98.7 (23 Dec 2023 01:18)  HR: 68 (23 Dec 2023 08:30) (60 - 105)  BP: 128/68 (23 Dec 2023 08:30) (108/66 - 128/68)  BP(mean): 80 (23 Dec 2023 05:53) (79 - 80)  RR: 16 (23 Dec 2023 08:30) (14 - 20)  SpO2: 100% (23 Dec 2023 08:30) (98% - 100%)    Parameters below as of 23 Dec 2023 08:30  Patient On (Oxygen Delivery Method): room air        General:  Constitutional: Female, appears stated age, in no apparent distress including pain  Head: Normocephalic & Atraumatic.  Respiratory: Breathing comfortably.    Neurological:  MS: Eyes closed, open to voice. Awake, alert, oriented to person, place, situation, time. Follows all commands including crossed.    Language: Speech is hypophonic, but otherwise without dysarthria, fluent with good repetition & comprehension. Naming intact.     CNs: PERRL (R = 3.5 mm --> 3.0 mm, L = 3.5 mm --> 3.0 mm). VFF. EOMI no nystagmus. V1-3 intact to LT b/l. No facial asymmetry b/l, full eye closure strength b/l. Hearing grossly normal (rubbing fingers) b/l. Tongue midline, normal movements, no atrophy.     Motor: Normal muscle bulk & tone. No noticeable tremor. No  drift.              Deltoid	Biceps	Triceps	   R	         5	             5	              5	 5	  		 	  L	         5	             5	              5	 5	  		    	H-Flex	K-Flex	K-Ext	D-Flex	P-Flex  R	    5	            5	           5	            5	           5		   L	    5	            5	           5	            5	           5		     Sensation: Intact to LT b/l throughout.     Cortical: Extinction on DSS (neglect): none    Reflexes:              Biceps(C5)       BR(C6)     Triceps(C7)               Patellar(L4)    Achilles(S1)    Plantar Resp  R	              2	          2	             2		                              2		    2		      Down   L	              2	          2	             2		                              2		    2		      Down     Coordination: intact rapid-alt movements. No dysmetria to FTN/HTS    Gait:     LABORATORY:  CBC                       10.2   4.21  )-----------( 337      ( 23 Dec 2023 06:24 )             32.7     Chem 12-23    140  |  107  |  9   ----------------------------<  81  3.6   |  23  |  0.61    Ca    8.6      23 Dec 2023 06:24  Phos  4.1     12-22  Mg     1.80     12-22    TPro  7.7  /  Alb  3.8  /  TBili  0.2  /  DBili  x   /  AST  18  /  ALT  11  /  AlkPhos  83  12-22    LFTs LIVER FUNCTIONS - ( 22 Dec 2023 15:13 )  Alb: 3.8 g/dL / Pro: 7.7 g/dL / ALK PHOS: 83 U/L / ALT: 11 U/L / AST: 18 U/L / GGT: x           Coagulopathy PT/INR - ( 22 Dec 2023 15:13 )   PT: 12.2 sec;   INR: 1.09 ratio         PTT - ( 22 Dec 2023 15:13 )  PTT:31.3 sec  Lipid Panel 12-23 Chol 130 LDL -- HDL 49<L> Trig 27  A1c   Cardiac enzymes     U/A Urinalysis Basic - ( 23 Dec 2023 06:24 )    Color: x / Appearance: x / SG: x / pH: x  Gluc: 81 mg/dL / Ketone: x  / Bili: x / Urobili: x   Blood: x / Protein: x / Nitrite: x   Leuk Esterase: x / RBC: x / WBC x   Sq Epi: x / Non Sq Epi: x / Bacteria: x      CSF  Immunological  Other    STUDIES & IMAGING:  Studies (EKG, EEG, EMG, etc):     Radiology (XR, CT, MR, U/S, TTE/CARLITA): INCOMPLETE     INTERVAL HISTORY:    PAST MEDICAL & SURGICAL HISTORY:  History of abnormal Pap smear      S/P cone biopsy of cervix  1/19        FAMILY HISTORY:  No pertinent family history in first degree relatives      SOCIAL HISTORY:   T/E/D:   Occupation:   Lives with:     MEDICATIONS (HOME):  Home Medications:    MEDICATIONS  (STANDING):  enoxaparin Injectable 40 milliGRAM(s) SubCutaneous every 24 hours    MEDICATIONS  (PRN):  acetaminophen     Tablet .. 650 milliGRAM(s) Oral every 6 hours PRN Temp greater or equal to 38C (100.4F), Mild Pain (1 - 3)  aluminum hydroxide/magnesium hydroxide/simethicone Suspension 30 milliLiter(s) Oral every 4 hours PRN Dyspepsia  melatonin 3 milliGRAM(s) Oral at bedtime PRN Insomnia  ondansetron Injectable 4 milliGRAM(s) IV Push every 8 hours PRN Nausea and/or Vomiting    ALLERGIES/INTOLERANCES:  Allergies  No Known Allergies    Intolerances    VITALS & EXAMINATION:  Vital Signs Last 24 Hrs  T(C): 36.8 (23 Dec 2023 08:30), Max: 37.1 (23 Dec 2023 01:18)  T(F): 98.3 (23 Dec 2023 08:30), Max: 98.7 (23 Dec 2023 01:18)  HR: 68 (23 Dec 2023 08:30) (60 - 105)  BP: 128/68 (23 Dec 2023 08:30) (108/66 - 128/68)  BP(mean): 80 (23 Dec 2023 05:53) (79 - 80)  RR: 16 (23 Dec 2023 08:30) (14 - 20)  SpO2: 100% (23 Dec 2023 08:30) (98% - 100%)    Parameters below as of 23 Dec 2023 08:30  Patient On (Oxygen Delivery Method): room air        General:  Constitutional: Female, appears stated age, in no apparent distress including pain  Head: Normocephalic & Atraumatic.  Respiratory: Breathing comfortably.    Neurological:  MS: Eyes closed, open to voice. Awake, alert, oriented to person, place, situation, time. Follows all commands including crossed.    Language: Speech is hypophonic, but otherwise without dysarthria, fluent with good repetition & comprehension. Naming intact.     CNs: PERRL (R = 3.5 mm --> 3.0 mm, L = 3.5 mm --> 3.0 mm). VFF. EOMI no nystagmus. V1-3 intact to LT b/l. No facial asymmetry b/l, full eye closure strength b/l. Hearing grossly normal (rubbing fingers) b/l. Tongue midline, normal movements, no atrophy.     Motor: Normal muscle bulk & tone. No noticeable tremor. No  drift.              Deltoid	Biceps	Triceps	   R	         5	             5	              5	 5	  		 	  L	         5	             5	              5	 5	  		    	H-Flex	K-Flex	K-Ext	D-Flex	P-Flex  R	    5	            5	           5	            5	           5		   L	    5	            5	           5	            5	           5		     Sensation: Intact to LT b/l throughout.     Cortical: Extinction on DSS (neglect): none    Reflexes:              Biceps(C5)       BR(C6)     Triceps(C7)               Patellar(L4)    Achilles(S1)    Plantar Resp  R	              2	          2	             2		 2		    2		      Withdrawal  L	              2	          2	             2		 2		    2		      Withdrawal     Coordination: intact rapid-alt movements. No dysmetria to FTN/HTS    Gait: Deferred.         LABORATORY:  CBC                       10.2   4.21  )-----------( 337      ( 23 Dec 2023 06:24 )             32.7     Chem 12-23    140  |  107  |  9   ----------------------------<  81  3.6   |  23  |  0.61    Ca    8.6      23 Dec 2023 06:24  Phos  4.1     12-22  Mg     1.80     12-22    TPro  7.7  /  Alb  3.8  /  TBili  0.2  /  DBili  x   /  AST  18  /  ALT  11  /  AlkPhos  83  12-22    LFTs LIVER FUNCTIONS - ( 22 Dec 2023 15:13 )  Alb: 3.8 g/dL / Pro: 7.7 g/dL / ALK PHOS: 83 U/L / ALT: 11 U/L / AST: 18 U/L / GGT: x           Coagulopathy PT/INR - ( 22 Dec 2023 15:13 )   PT: 12.2 sec;   INR: 1.09 ratio         PTT - ( 22 Dec 2023 15:13 )  PTT:31.3 sec  Lipid Panel 12-23 Chol 130 LDL -- HDL 49<L> Trig 27  A1c   Cardiac enzymes     U/A Urinalysis Basic - ( 23 Dec 2023 06:24 )    Color: x / Appearance: x / SG: x / pH: x  Gluc: 81 mg/dL / Ketone: x  / Bili: x / Urobili: x   Blood: x / Protein: x / Nitrite: x   Leuk Esterase: x / RBC: x / WBC x   Sq Epi: x / Non Sq Epi: x / Bacteria: x      CSF  Immunological  Other    STUDIES & IMAGING:  Studies (EKG, EEG, EMG, etc):     Radiology (XR, CT, MR, U/S, TTE/CARLITA): INTERVAL HISTORY: Patient seen at bedside by neurology attending. No acute events overnight. Patient reports improvement in her symptoms. Discussed workup moving forward.     PAST MEDICAL & SURGICAL HISTORY:  History of abnormal Pap smear      S/P cone biopsy of cervix  1/19        FAMILY HISTORY:  No pertinent family history in first degree relatives      SOCIAL HISTORY:   T/E/D:   Occupation:   Lives with:     MEDICATIONS (HOME):  Home Medications:    MEDICATIONS  (STANDING):  enoxaparin Injectable 40 milliGRAM(s) SubCutaneous every 24 hours    MEDICATIONS  (PRN):  acetaminophen     Tablet .. 650 milliGRAM(s) Oral every 6 hours PRN Temp greater or equal to 38C (100.4F), Mild Pain (1 - 3)  aluminum hydroxide/magnesium hydroxide/simethicone Suspension 30 milliLiter(s) Oral every 4 hours PRN Dyspepsia  melatonin 3 milliGRAM(s) Oral at bedtime PRN Insomnia  ondansetron Injectable 4 milliGRAM(s) IV Push every 8 hours PRN Nausea and/or Vomiting    ALLERGIES/INTOLERANCES:  Allergies  No Known Allergies    Intolerances    VITALS & EXAMINATION:  Vital Signs Last 24 Hrs  T(C): 36.8 (23 Dec 2023 08:30), Max: 37.1 (23 Dec 2023 01:18)  T(F): 98.3 (23 Dec 2023 08:30), Max: 98.7 (23 Dec 2023 01:18)  HR: 68 (23 Dec 2023 08:30) (60 - 105)  BP: 128/68 (23 Dec 2023 08:30) (108/66 - 128/68)  BP(mean): 80 (23 Dec 2023 05:53) (79 - 80)  RR: 16 (23 Dec 2023 08:30) (14 - 20)  SpO2: 100% (23 Dec 2023 08:30) (98% - 100%)    Parameters below as of 23 Dec 2023 08:30  Patient On (Oxygen Delivery Method): room air        General:  Constitutional: Female, appears stated age, in no apparent distress including pain  Head: Normocephalic & Atraumatic.  Respiratory: Breathing comfortably.    Neurological:  MS: Eyes closed, open to voice. Awake, alert, oriented to person, place, situation, time. Follows all commands including crossed.    Language: Speech is hypophonic, but otherwise without dysarthria, fluent with good repetition & comprehension. Naming intact.     CNs: PERRL (R = 3.5 mm --> 3.0 mm, L = 3.5 mm --> 3.0 mm). VFF. EOMI no nystagmus. V1-3 intact to LT b/l. No facial asymmetry b/l, full eye closure strength b/l. Hearing grossly normal (rubbing fingers) b/l. Tongue midline, normal movements, no atrophy.     Motor: Normal muscle bulk & tone. No noticeable tremor. No  drift.              Deltoid	Biceps	Triceps	   R	         5	             5	              5	 5	  		 	  L	         5	             5	              5	 5	  		    	H-Flex	K-Flex	K-Ext	D-Flex	P-Flex  R	    5	            5	           5	            5	           5		   L	    5	            5	           5	            5	           5		     Sensation: Intact to LT b/l throughout.     Cortical: Extinction on DSS (neglect): none    Reflexes:              Biceps(C5)       BR(C6)     Triceps(C7)               Patellar(L4)    Achilles(S1)    Plantar Resp  R	              2	          2	             2		 2		    2		      Withdrawal  L	              2	          2	             2		 2		    2		      Withdrawal     Coordination: intact rapid-alt movements. No dysmetria to FTN/HTS    Gait: Deferred.         LABORATORY:  CBC                       10.2   4.21  )-----------( 337      ( 23 Dec 2023 06:24 )             32.7     Chem 12-23    140  |  107  |  9   ----------------------------<  81  3.6   |  23  |  0.61    Ca    8.6      23 Dec 2023 06:24  Phos  4.1     12-22  Mg     1.80     12-22    TPro  7.7  /  Alb  3.8  /  TBili  0.2  /  DBili  x   /  AST  18  /  ALT  11  /  AlkPhos  83  12-22    LFTs LIVER FUNCTIONS - ( 22 Dec 2023 15:13 )  Alb: 3.8 g/dL / Pro: 7.7 g/dL / ALK PHOS: 83 U/L / ALT: 11 U/L / AST: 18 U/L / GGT: x           Coagulopathy PT/INR - ( 22 Dec 2023 15:13 )   PT: 12.2 sec;   INR: 1.09 ratio         PTT - ( 22 Dec 2023 15:13 )  PTT:31.3 sec  Lipid Panel 12-23 Chol 130 LDL -- HDL 49<L> Trig 27  A1c   Cardiac enzymes     U/A Urinalysis Basic - ( 23 Dec 2023 06:24 )    Color: x / Appearance: x / SG: x / pH: x  Gluc: 81 mg/dL / Ketone: x  / Bili: x / Urobili: x   Blood: x / Protein: x / Nitrite: x   Leuk Esterase: x / RBC: x / WBC x   Sq Epi: x / Non Sq Epi: x / Bacteria: x      CSF  Immunological  Other    STUDIES & IMAGING:  Studies (EKG, EEG, EMG, etc):     Radiology (XR, CT, MR, U/S, TTE/CARLITA):    < from: CT Angio Neck Stroke Protocol w/ IV Cont (12.22.23 @ 15:34) >  HEAD CT AND RAPID PERFUSION:    HEAD CT: Mild volume loss, microvascular disease, no acute hemorrhage or   midline shift.    CT PERFUSION demonstrated: Tmax greater then 6 seconds in the high   frontal region slightly left and in the posterior fossa at the level of   the brachium pontis bilaterally. Unclear if this is artifactual    If symptoms persist consider follow up head CT or MRI, MRAif no   contraindication.    CTA COW:  Patent intracranial circulation without flow limiting stenosis    CTA NECK: Patent, ECAs, ICAs, no  hemodynamically significant stenosis at    ICA origins by NASCET criteria.  Bilateral vertebral arteries are patent without flow limiting stenosis.    < end of copied text >

## 2023-12-23 NOTE — PROGRESS NOTE ADULT - TIME BILLING
Time-based billing (NON-critical care).     More than 50% of the visit was spent counseling and / or coordinating care by the attending physician.      The necessity of the time spent during the encounter on this date of service was due to: documentation in [facility], reviewing chart and coordinating care with patient/ACPs and interdisciplinary staff (such as , social workers, etc) as well as reviewing vitals, laboratory data, radiology, medication list, consultants' recommendations and prior records. Interventions were performed as documented above. Time-based billing (NON-critical care).     More than 50% of the visit was spent counseling and / or coordinating care by the attending physician.      The necessity of the time spent during the encounter on this date of service was due to: documentation in [facility], reviewing chart and coordinating care with patient/ACPs and interdisciplinary staff (such as , social workers, etc) as well as reviewing vitals, laboratory data, radiology, medication list, consultants' recommendations and prior records. Interventions were performed as documented above.

## 2023-12-23 NOTE — PROGRESS NOTE ADULT - NS ATTEND AMEND GEN_ALL_CORE FT
Ms. Bowman is a 49 yo woman with sudden onset unresponsiveness with persistent mild encephalopathy - improving clinically.   The differential diagnosis includes: Seizure, other CNS lesion, toxic metabolic septic encephalopathy  Continuous EEG  MRI brain w/wo gado.  Thank you Ms. Bowman is a 47 yo woman with sudden onset unresponsiveness with persistent mild encephalopathy - improving clinically.   The differential diagnosis includes: Seizure, other CNS lesion, toxic metabolic septic encephalopathy  Continuous EEG  MRI brain w/wo gado.  Thank you

## 2023-12-23 NOTE — PROGRESS NOTE ADULT - SUBJECTIVE AND OBJECTIVE BOX
John Torres MD  Division of Hospitalist Medicine  Pager [phone]  Available on Teams    CC: Patient is a 48y old  Female who presents with a chief complaint of episode of unresponsiveness (23 Dec 2023 11:33)      INTERVAL EVENTS: ROBERT    SUBJECTIVE / INTERVAL HPI: Patient seen and examined at bedside. Pt denies fevers, chills, chest pain, shortness of breath, cough, abdominal pain, nausea, vomiting, diarrhea, constipation, leg pain/swelling.     ROS: negative unless otherwise stated above.    VITAL SIGNS:  Vital Signs Last 24 Hrs  T(C): 36.9 (23 Dec 2023 12:09), Max: 37.1 (23 Dec 2023 01:18)  T(F): 98.4 (23 Dec 2023 12:09), Max: 98.7 (23 Dec 2023 01:18)  HR: 60 (23 Dec 2023 12:09) (60 - 105)  BP: 106/60 (23 Dec 2023 12:09) (106/60 - 128/68)  BP(mean): 80 (23 Dec 2023 05:53) (79 - 80)  RR: 17 (23 Dec 2023 12:09) (14 - 20)  SpO2: 100% (23 Dec 2023 12:09) (98% - 100%)    Parameters below as of 23 Dec 2023 12:09  Patient On (Oxygen Delivery Method): room air            PHYSICAL EXAM:    General: NAD  HEENT: MMM  Neck: supple  Cardiovascular: +S1/S2; RRR  Respiratory: CTA B/L; no W/R/R  Gastrointestinal: soft, NT/ND  Extremities: WWP; no edema, clubbing or cyanosis  Vascular: palpable radial, DP pulses B/L  Neurological: AAOx3; no focal deficits    MEDICATIONS:  MEDICATIONS  (STANDING):  enoxaparin Injectable 40 milliGRAM(s) SubCutaneous every 24 hours    MEDICATIONS  (PRN):  acetaminophen     Tablet .. 650 milliGRAM(s) Oral every 6 hours PRN Temp greater or equal to 38C (100.4F), Mild Pain (1 - 3)  aluminum hydroxide/magnesium hydroxide/simethicone Suspension 30 milliLiter(s) Oral every 4 hours PRN Dyspepsia  melatonin 3 milliGRAM(s) Oral at bedtime PRN Insomnia  ondansetron Injectable 4 milliGRAM(s) IV Push every 8 hours PRN Nausea and/or Vomiting      ALLERGIES:  Allergies    No Known Allergies    Intolerances        LABS:                        10.2   4.21  )-----------( 337      ( 23 Dec 2023 06:24 )             32.7     12-23    140  |  107  |  9   ----------------------------<  81  3.6   |  23  |  0.61    Ca    8.6      23 Dec 2023 06:24  Phos  4.1     12-22  Mg     1.80     12-22    TPro  7.7  /  Alb  3.8  /  TBili  0.2  /  DBili  x   /  AST  18  /  ALT  11  /  AlkPhos  83  12-22    PT/INR - ( 22 Dec 2023 15:13 )   PT: 12.2 sec;   INR: 1.09 ratio         PTT - ( 22 Dec 2023 15:13 )  PTT:31.3 sec  Urinalysis Basic - ( 23 Dec 2023 06:24 )    Color: x / Appearance: x / SG: x / pH: x  Gluc: 81 mg/dL / Ketone: x  / Bili: x / Urobili: x   Blood: x / Protein: x / Nitrite: x   Leuk Esterase: x / RBC: x / WBC x   Sq Epi: x / Non Sq Epi: x / Bacteria: x      CAPILLARY BLOOD GLUCOSE          RADIOLOGY & ADDITIONAL TESTS: Reviewed. John Torres MD  Division of Hospitalist Medicine  Pager [phone]  Available on Teams    CC: Patient is a 48y old  Female who presents with a chief complaint of episode of unresponsiveness (23 Dec 2023 11:33)      INTERVAL EVENTS: ROBERT    SUBJECTIVE / INTERVAL HPI: Patient seen and examined at bedside. Pt denies fevers, chills, chest pain, shortness of breath, cough, abdominal pain, nausea, vomiting, diarrhea, constipation, leg pain/swelling.     ROS: negative unless otherwise stated above.    VITAL SIGNS:  Vital Signs Last 24 Hrs  T(C): 36.9 (23 Dec 2023 12:09), Max: 37.1 (23 Dec 2023 01:18)  T(F): 98.4 (23 Dec 2023 12:09), Max: 98.7 (23 Dec 2023 01:18)  HR: 60 (23 Dec 2023 12:09) (60 - 105)  BP: 106/60 (23 Dec 2023 12:09) (106/60 - 128/68)  BP(mean): 80 (23 Dec 2023 05:53) (79 - 80)  RR: 17 (23 Dec 2023 12:09) (14 - 20)  SpO2: 100% (23 Dec 2023 12:09) (98% - 100%)    Parameters below as of 23 Dec 2023 12:09  Patient On (Oxygen Delivery Method): room air            PHYSICAL EXAM:    General: NAD  HEENT: MMM  Neck: supple  Cardiovascular: +S1/S2; RRR  Respiratory: CTA B/L; no W/R/R  Gastrointestinal: soft, NT/ND  Extremities: WWP; no edema, clubbing or cyanosis  Vascular: palpable radial, DP pulses B/L  Neurological: AAOx3; no focal deficits    MEDICATIONS:  MEDICATIONS  (STANDING):  enoxaparin Injectable 40 milliGRAM(s) SubCutaneous every 24 hours    MEDICATIONS  (PRN):  acetaminophen     Tablet .. 650 milliGRAM(s) Oral every 6 hours PRN Temp greater or equal to 38C (100.4F), Mild Pain (1 - 3)  aluminum hydroxide/magnesium hydroxide/simethicone Suspension 30 milliLiter(s) Oral every 4 hours PRN Dyspepsia  melatonin 3 milliGRAM(s) Oral at bedtime PRN Insomnia  ondansetron Injectable 4 milliGRAM(s) IV Push every 8 hours PRN Nausea and/or Vomiting      ALLERGIES:  Allergies    No Known Allergies    Intolerances        LABS:                        10.2   4.21  )-----------( 337      ( 23 Dec 2023 06:24 )             32.7     12-23    140  |  107  |  9   ----------------------------<  81  3.6   |  23  |  0.61    Ca    8.6      23 Dec 2023 06:24  Phos  4.1     12-22  Mg     1.80     12-22    TPro  7.7  /  Alb  3.8  /  TBili  0.2  /  DBili  x   /  AST  18  /  ALT  11  /  AlkPhos  83  12-22    PT/INR - ( 22 Dec 2023 15:13 )   PT: 12.2 sec;   INR: 1.09 ratio         PTT - ( 22 Dec 2023 15:13 )  PTT:31.3 sec  Urinalysis Basic - ( 23 Dec 2023 06:24 )    Color: x / Appearance: x / SG: x / pH: x  Gluc: 81 mg/dL / Ketone: x  / Bili: x / Urobili: x   Blood: x / Protein: x / Nitrite: x   Leuk Esterase: x / RBC: x / WBC x   Sq Epi: x / Non Sq Epi: x / Bacteria: x      CAPILLARY BLOOD GLUCOSE          RADIOLOGY & ADDITIONAL TESTS: Reviewed.

## 2023-12-23 NOTE — PATIENT PROFILE ADULT - FALL HARM RISK - HARM RISK INTERVENTIONS
Communicate Risk of Fall with Harm to all staff/Reinforce activity limits and safety measures with patient and family/Tailored Fall Risk Interventions/Visual Cue: Yellow wristband and red socks/Bed in lowest position, wheels locked, appropriate side rails in place/Call bell, personal items and telephone in reach/Instruct patient to call for assistance before getting out of bed or chair/Non-slip footwear when patient is out of bed/[place] to call system/Physically safe environment - no spills, clutter or unnecessary equipment/Purposeful Proactive Rounding/Room/bathroom lighting operational, light cord in reach Communicate Risk of Fall with Harm to all staff/Reinforce activity limits and safety measures with patient and family/Tailored Fall Risk Interventions/Visual Cue: Yellow wristband and red socks/Bed in lowest position, wheels locked, appropriate side rails in place/Call bell, personal items and telephone in reach/Instruct patient to call for assistance before getting out of bed or chair/Non-slip footwear when patient is out of bed/[place] to call system/Physically safe environment - no spills, clutter or unnecessary equipment/Purposeful Proactive Rounding/Room/bathroom lighting operational, light cord in reach

## 2023-12-23 NOTE — PROGRESS NOTE ADULT - ASSESSMENT
Name Change: Ms. Hewitt is a 48F no significant PMH presenting with an episode of seizure-like activity, admitted for seizure workup.

## 2023-12-23 NOTE — PROGRESS NOTE ADULT - ASSESSMENT
INCOMPLETE     Impression:     Recommendations               Patient case discussed and seen with Neuro hospitalist attending, Dr. Dang.  48y (1975) woman with unclear PMH presenting as code stroke for decreased verbal output. 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 Her eyes then rolled back and she was foaming out of her mouth. No shaking of extremities, incontinence, tongue biting, weakness, numbness, headache, neck pain. Her eyes were rolled back form the time she was in the endoscope suite to the ED, over 15 minutes. Patient does not recall event of earlier today. At baseline, patient is independent and ambulates without assistance. On exam, patient mild non fluent speech. CTH no acute findings, CTA no  hemodynamically significant stenosis, CTP  Tmax greater then 6 seconds in the high frontal region slightly left and in the posterior fossa at the level of the brachium pontis bilaterally likely artifact. Patient's encephalopathy improved today 12/23; pending workup.     Impression: Improving psychomotor slowing and amnesia following unresponsive episode of unclear etiology at this time. Rule out toxic metabolic/infectious etiologies. Rule out focal non-motor seizure. Rule out intracranial pathologies.    Recommendations     [] No antiseizure medications at this time  [] MRI brain w/w/o contrast   [] vEEG   [] Basic infectious workup   [] Utox, alcohol level   [] Telemetry monitoring; Neurochecks/VS q4hr  [] Seizure, fall and aspiration precautions  [] Given concern for seizure, advise patient to not drive, operate heavy machinery, avoid heights, pools, bathtubs, locked doors until cleared by further follow up outpatient by neurology.     Patient case discussed and seen with Neuro hospitalist attending, Dr. Dang.

## 2023-12-23 NOTE — ED ADULT NURSE REASSESSMENT NOTE - NS ED NURSE REASSESS COMMENT FT1
BREAK RN: Pt resting comfortably in stretcher, RR even and unlabored, satting 100% on RA. NSR on cardiac monitor. VSS. Pt lethargic, however is responsive to verbal and tactile stimuli. Safety measures in place, call bell in reach, pt stable upon exiting room
Patient resting comfortably on stretcher. denies any pain, Not in acute distress. Alert and oriented x 4. safety maintained. VSS. NSR on tele. GCS 15/15.Will continue to monitor.
Patient resting comfortably on stretcher. not in acute distress. alert and oriented 3, patient appears very sleepy. Reports she is still feeling light headed. VSS. Will continue to monitor.
Pt is A&Ox3, ambulatory at baseline. Pt is resting comfortably in stretcher, endorsing lightheadedness. Neuro/sensory intact. denies chest pain, SOB, headache, numbness/tingling. breathing even and nonlabored. NSR on cardiac monitor. stretcher set in lowest position, call bell within reach, safety maintained.

## 2023-12-23 NOTE — PROGRESS NOTE ADULT - PROBLEM SELECTOR PLAN 1
Last thing pt remembers before awakening in the hospital was sitting down. Pt was told she had episode of LOC.   - CTP  Tmax greater then 6 seconds in the high frontal region slightly left and in the posterior fossa at the level of the brachium pontis bilaterally likely artifact. "  - s/p Keppra 1g IV x1   - UTox negative  Plan:  - neuro recs appreciated  - No antiseizure medications at this time  - f/u vEEG  - f/u MRI  - f/u Utox  - Seizure, fall and aspiration precautions

## 2023-12-24 LAB
ANION GAP SERPL CALC-SCNC: 12 MMOL/L — SIGNIFICANT CHANGE UP (ref 7–14)
ANION GAP SERPL CALC-SCNC: 12 MMOL/L — SIGNIFICANT CHANGE UP (ref 7–14)
BUN SERPL-MCNC: 12 MG/DL — SIGNIFICANT CHANGE UP (ref 7–23)
BUN SERPL-MCNC: 12 MG/DL — SIGNIFICANT CHANGE UP (ref 7–23)
CALCIUM SERPL-MCNC: 8.5 MG/DL — SIGNIFICANT CHANGE UP (ref 8.4–10.5)
CALCIUM SERPL-MCNC: 8.5 MG/DL — SIGNIFICANT CHANGE UP (ref 8.4–10.5)
CHLORIDE SERPL-SCNC: 108 MMOL/L — HIGH (ref 98–107)
CHLORIDE SERPL-SCNC: 108 MMOL/L — HIGH (ref 98–107)
CO2 SERPL-SCNC: 22 MMOL/L — SIGNIFICANT CHANGE UP (ref 22–31)
CO2 SERPL-SCNC: 22 MMOL/L — SIGNIFICANT CHANGE UP (ref 22–31)
CREAT SERPL-MCNC: 0.49 MG/DL — LOW (ref 0.5–1.3)
CREAT SERPL-MCNC: 0.49 MG/DL — LOW (ref 0.5–1.3)
EGFR: 115 ML/MIN/1.73M2 — SIGNIFICANT CHANGE UP
EGFR: 115 ML/MIN/1.73M2 — SIGNIFICANT CHANGE UP
GLUCOSE SERPL-MCNC: 83 MG/DL — SIGNIFICANT CHANGE UP (ref 70–99)
GLUCOSE SERPL-MCNC: 83 MG/DL — SIGNIFICANT CHANGE UP (ref 70–99)
HCT VFR BLD CALC: 32.2 % — LOW (ref 34.5–45)
HCT VFR BLD CALC: 32.2 % — LOW (ref 34.5–45)
HGB BLD-MCNC: 10.5 G/DL — LOW (ref 11.5–15.5)
HGB BLD-MCNC: 10.5 G/DL — LOW (ref 11.5–15.5)
MAGNESIUM SERPL-MCNC: 1.7 MG/DL — SIGNIFICANT CHANGE UP (ref 1.6–2.6)
MAGNESIUM SERPL-MCNC: 1.7 MG/DL — SIGNIFICANT CHANGE UP (ref 1.6–2.6)
MCHC RBC-ENTMCNC: 25.9 PG — LOW (ref 27–34)
MCHC RBC-ENTMCNC: 25.9 PG — LOW (ref 27–34)
MCHC RBC-ENTMCNC: 32.6 GM/DL — SIGNIFICANT CHANGE UP (ref 32–36)
MCHC RBC-ENTMCNC: 32.6 GM/DL — SIGNIFICANT CHANGE UP (ref 32–36)
MCV RBC AUTO: 79.3 FL — LOW (ref 80–100)
MCV RBC AUTO: 79.3 FL — LOW (ref 80–100)
NRBC # BLD: 0 /100 WBCS — SIGNIFICANT CHANGE UP (ref 0–0)
NRBC # BLD: 0 /100 WBCS — SIGNIFICANT CHANGE UP (ref 0–0)
NRBC # FLD: 0 K/UL — SIGNIFICANT CHANGE UP (ref 0–0)
NRBC # FLD: 0 K/UL — SIGNIFICANT CHANGE UP (ref 0–0)
PHOSPHATE SERPL-MCNC: 3.1 MG/DL — SIGNIFICANT CHANGE UP (ref 2.5–4.5)
PHOSPHATE SERPL-MCNC: 3.1 MG/DL — SIGNIFICANT CHANGE UP (ref 2.5–4.5)
PLATELET # BLD AUTO: 351 K/UL — SIGNIFICANT CHANGE UP (ref 150–400)
PLATELET # BLD AUTO: 351 K/UL — SIGNIFICANT CHANGE UP (ref 150–400)
POTASSIUM SERPL-MCNC: 3.6 MMOL/L — SIGNIFICANT CHANGE UP (ref 3.5–5.3)
POTASSIUM SERPL-MCNC: 3.6 MMOL/L — SIGNIFICANT CHANGE UP (ref 3.5–5.3)
POTASSIUM SERPL-SCNC: 3.6 MMOL/L — SIGNIFICANT CHANGE UP (ref 3.5–5.3)
POTASSIUM SERPL-SCNC: 3.6 MMOL/L — SIGNIFICANT CHANGE UP (ref 3.5–5.3)
RBC # BLD: 4.06 M/UL — SIGNIFICANT CHANGE UP (ref 3.8–5.2)
RBC # BLD: 4.06 M/UL — SIGNIFICANT CHANGE UP (ref 3.8–5.2)
RBC # FLD: 15.9 % — HIGH (ref 10.3–14.5)
RBC # FLD: 15.9 % — HIGH (ref 10.3–14.5)
SODIUM SERPL-SCNC: 142 MMOL/L — SIGNIFICANT CHANGE UP (ref 135–145)
SODIUM SERPL-SCNC: 142 MMOL/L — SIGNIFICANT CHANGE UP (ref 135–145)
WBC # BLD: 4.33 K/UL — SIGNIFICANT CHANGE UP (ref 3.8–10.5)
WBC # BLD: 4.33 K/UL — SIGNIFICANT CHANGE UP (ref 3.8–10.5)
WBC # FLD AUTO: 4.33 K/UL — SIGNIFICANT CHANGE UP (ref 3.8–10.5)
WBC # FLD AUTO: 4.33 K/UL — SIGNIFICANT CHANGE UP (ref 3.8–10.5)

## 2023-12-24 PROCEDURE — 99232 SBSQ HOSP IP/OBS MODERATE 35: CPT

## 2023-12-24 RX ORDER — POTASSIUM CHLORIDE 20 MEQ
20 PACKET (EA) ORAL ONCE
Refills: 0 | Status: COMPLETED | OUTPATIENT
Start: 2023-12-24 | End: 2023-12-24

## 2023-12-24 RX ORDER — MAGNESIUM SULFATE 500 MG/ML
2 VIAL (ML) INJECTION ONCE
Refills: 0 | Status: COMPLETED | OUTPATIENT
Start: 2023-12-24 | End: 2023-12-24

## 2023-12-24 RX ADMIN — Medication 25 GRAM(S): at 12:53

## 2023-12-24 RX ADMIN — Medication 20 MILLIEQUIVALENT(S): at 12:53

## 2023-12-24 NOTE — PROGRESS NOTE ADULT - PROBLEM SELECTOR PLAN 1
Last thing pt remembers before awakening in the hospital was sitting down. Pt was told she had episode of LOC.   - CTP  Tmax greater then 6 seconds in the high frontal region slightly left and in the posterior fossa at the level of the brachium pontis bilaterally likely artifact. "  - s/p Keppra 1g IV x1   - UTox negative  - UA with RBCs from menstruation  Plan:  - neuro recs appreciated  - No antiseizure medications at this time  - f/u vEEG  - f/u MRI  - f/u Utox  - Seizure, fall and aspiration precautions

## 2023-12-24 NOTE — PROGRESS NOTE ADULT - TIME BILLING
Time-based billing (NON-critical care).     More than 50% of the visit was spent counseling and / or coordinating care by the attending physician.      The necessity of the time spent during the encounter on this date of service was due to: documentation in [place], reviewing chart and coordinating care with patient/ACPs and interdisciplinary staff (such as , social workers, etc) as well as reviewing vitals, laboratory data, radiology, medication list, consultants' recommendations and prior records. Interventions were performed as documented above. Time-based billing (NON-critical care).     More than 50% of the visit was spent counseling and / or coordinating care by the attending physician.      The necessity of the time spent during the encounter on this date of service was due to: documentation in [place], reviewing chart and coordinating care with patient/ACPs and interdisciplinary staff (such as , social workers, etc) as well as reviewing vitals, laboratory data, radiology, medication list, consultants' recommendations and prior records. Interventions were performed as documented above.

## 2023-12-24 NOTE — PROGRESS NOTE ADULT - SUBJECTIVE AND OBJECTIVE BOX
John Torres MD  Division of Hospitalist Medicine  Pager [phone]  Available on Teams    CC: Patient is a 48y old  Female who presents with a chief complaint of episode of unresponsiveness (23 Dec 2023 14:57)      INTERVAL EVENTS: ROBERT    SUBJECTIVE / INTERVAL HPI: Patient seen and examined at bedside. Pt grossly denies any new complaints.     ROS: negative unless otherwise stated above.    VITAL SIGNS:  Vital Signs Last 24 Hrs  T(C): 37.1 (24 Dec 2023 12:20), Max: 37.1 (24 Dec 2023 12:20)  T(F): 98.8 (24 Dec 2023 12:20), Max: 98.8 (24 Dec 2023 12:20)  HR: 57 (24 Dec 2023 12:20) (57 - 72)  BP: 107/65 (24 Dec 2023 12:20) (107/65 - 128/72)  BP(mean): --  RR: 16 (24 Dec 2023 12:20) (16 - 18)  SpO2: 100% (24 Dec 2023 12:20) (100% - 100%)    Parameters below as of 24 Dec 2023 12:20  Patient On (Oxygen Delivery Method): room air            PHYSICAL EXAM:    General: NAD  HEENT: MMM  Neck: supple  Cardiovascular: +S1/S2; RRR  Respiratory: CTA B/L; no W/R/R  Gastrointestinal: soft, NT/ND  Extremities: WWP; no edema, clubbing or cyanosis  Vascular: palpable radial, DP pulses B/L  Neurological: AAOx3; no focal deficits    MEDICATIONS:  MEDICATIONS  (STANDING):  enoxaparin Injectable 40 milliGRAM(s) SubCutaneous every 24 hours    MEDICATIONS  (PRN):  acetaminophen     Tablet .. 650 milliGRAM(s) Oral every 6 hours PRN Temp greater or equal to 38C (100.4F), Mild Pain (1 - 3)  aluminum hydroxide/magnesium hydroxide/simethicone Suspension 30 milliLiter(s) Oral every 4 hours PRN Dyspepsia  melatonin 3 milliGRAM(s) Oral at bedtime PRN Insomnia  ondansetron Injectable 4 milliGRAM(s) IV Push every 8 hours PRN Nausea and/or Vomiting      ALLERGIES:  Allergies    No Known Allergies    Intolerances        LABS:                        10.5   4.33  )-----------( 351      ( 24 Dec 2023 05:35 )             32.2     12-24    142  |  108<H>  |  12  ----------------------------<  83  3.6   |  22  |  0.49<L>    Ca    8.5      24 Dec 2023 05:35  Phos  3.1     12-24  Mg     1.70     12-24    TPro  7.7  /  Alb  3.8  /  TBili  0.2  /  DBili  x   /  AST  18  /  ALT  11  /  AlkPhos  83  12-22    PT/INR - ( 22 Dec 2023 15:13 )   PT: 12.2 sec;   INR: 1.09 ratio         PTT - ( 22 Dec 2023 15:13 )  PTT:31.3 sec  Urinalysis Basic - ( 24 Dec 2023 05:35 )    Color: x / Appearance: x / SG: x / pH: x  Gluc: 83 mg/dL / Ketone: x  / Bili: x / Urobili: x   Blood: x / Protein: x / Nitrite: x   Leuk Esterase: x / RBC: x / WBC x   Sq Epi: x / Non Sq Epi: x / Bacteria: x      CAPILLARY BLOOD GLUCOSE          RADIOLOGY & ADDITIONAL TESTS: Reviewed. John Torres MD  Division of Hospitalist Medicine  Pager [phone]  Available on Teams    CC: Patient is a 48y old  Female who presents with a chief complaint of episode of unresponsiveness (23 Dec 2023 14:57)      INTERVAL EVENTS: ROBERT    SUBJECTIVE / INTERVAL HPI: Patient seen and examined at bedside. Pt grossly denies any new complaints.     ROS: negative unless otherwise stated above.    VITAL SIGNS:  Vital Signs Last 24 Hrs  T(C): 37.1 (24 Dec 2023 12:20), Max: 37.1 (24 Dec 2023 12:20)  T(F): 98.8 (24 Dec 2023 12:20), Max: 98.8 (24 Dec 2023 12:20)  HR: 57 (24 Dec 2023 12:20) (57 - 72)  BP: 107/65 (24 Dec 2023 12:20) (107/65 - 128/72)  BP(mean): --  RR: 16 (24 Dec 2023 12:20) (16 - 18)  SpO2: 100% (24 Dec 2023 12:20) (100% - 100%)    Parameters below as of 24 Dec 2023 12:20  Patient On (Oxygen Delivery Method): room air            PHYSICAL EXAM:    General: NAD  HEENT: MMM  Neck: supple  Cardiovascular: +S1/S2; RRR  Respiratory: CTA B/L; no W/R/R  Gastrointestinal: soft, NT/ND  Extremities: WWP; no edema, clubbing or cyanosis  Vascular: palpable radial, DP pulses B/L  Neurological: AAOx3; no focal deficits    MEDICATIONS:  MEDICATIONS  (STANDING):  enoxaparin Injectable 40 milliGRAM(s) SubCutaneous every 24 hours    MEDICATIONS  (PRN):  acetaminophen     Tablet .. 650 milliGRAM(s) Oral every 6 hours PRN Temp greater or equal to 38C (100.4F), Mild Pain (1 - 3)  aluminum hydroxide/magnesium hydroxide/simethicone Suspension 30 milliLiter(s) Oral every 4 hours PRN Dyspepsia  melatonin 3 milliGRAM(s) Oral at bedtime PRN Insomnia  ondansetron Injectable 4 milliGRAM(s) IV Push every 8 hours PRN Nausea and/or Vomiting      ALLERGIES:  Allergies    No Known Allergies    Intolerances        LABS:                        10.5   4.33  )-----------( 351      ( 24 Dec 2023 05:35 )             32.2     12-24    142  |  108<H>  |  12  ----------------------------<  83  3.6   |  22  |  0.49<L>    Ca    8.5      24 Dec 2023 05:35  Phos  3.1     12-24  Mg     1.70     12-24    TPro  7.7  /  Alb  3.8  /  TBili  0.2  /  DBili  x   /  AST  18  /  ALT  11  /  AlkPhos  83  12-22    PT/INR - ( 22 Dec 2023 15:13 )   PT: 12.2 sec;   INR: 1.09 ratio         PTT - ( 22 Dec 2023 15:13 )  PTT:31.3 sec  Urinalysis Basic - ( 24 Dec 2023 05:35 )    Color: x / Appearance: x / SG: x / pH: x  Gluc: 83 mg/dL / Ketone: x  / Bili: x / Urobili: x   Blood: x / Protein: x / Nitrite: x   Leuk Esterase: x / RBC: x / WBC x   Sq Epi: x / Non Sq Epi: x / Bacteria: x      CAPILLARY BLOOD GLUCOSE          RADIOLOGY & ADDITIONAL TESTS: Reviewed.

## 2023-12-25 ENCOUNTER — TRANSCRIPTION ENCOUNTER (OUTPATIENT)
Age: 48
End: 2023-12-25

## 2023-12-25 LAB
ANION GAP SERPL CALC-SCNC: 10 MMOL/L — SIGNIFICANT CHANGE UP (ref 7–14)
ANION GAP SERPL CALC-SCNC: 10 MMOL/L — SIGNIFICANT CHANGE UP (ref 7–14)
BUN SERPL-MCNC: 12 MG/DL — SIGNIFICANT CHANGE UP (ref 7–23)
BUN SERPL-MCNC: 12 MG/DL — SIGNIFICANT CHANGE UP (ref 7–23)
CALCIUM SERPL-MCNC: 9.2 MG/DL — SIGNIFICANT CHANGE UP (ref 8.4–10.5)
CALCIUM SERPL-MCNC: 9.2 MG/DL — SIGNIFICANT CHANGE UP (ref 8.4–10.5)
CHLORIDE SERPL-SCNC: 103 MMOL/L — SIGNIFICANT CHANGE UP (ref 98–107)
CHLORIDE SERPL-SCNC: 103 MMOL/L — SIGNIFICANT CHANGE UP (ref 98–107)
CO2 SERPL-SCNC: 23 MMOL/L — SIGNIFICANT CHANGE UP (ref 22–31)
CO2 SERPL-SCNC: 23 MMOL/L — SIGNIFICANT CHANGE UP (ref 22–31)
CREAT SERPL-MCNC: 0.53 MG/DL — SIGNIFICANT CHANGE UP (ref 0.5–1.3)
CREAT SERPL-MCNC: 0.53 MG/DL — SIGNIFICANT CHANGE UP (ref 0.5–1.3)
EGFR: 113 ML/MIN/1.73M2 — SIGNIFICANT CHANGE UP
EGFR: 113 ML/MIN/1.73M2 — SIGNIFICANT CHANGE UP
GLUCOSE SERPL-MCNC: 99 MG/DL — SIGNIFICANT CHANGE UP (ref 70–99)
GLUCOSE SERPL-MCNC: 99 MG/DL — SIGNIFICANT CHANGE UP (ref 70–99)
HCT VFR BLD CALC: 36.8 % — SIGNIFICANT CHANGE UP (ref 34.5–45)
HCT VFR BLD CALC: 36.8 % — SIGNIFICANT CHANGE UP (ref 34.5–45)
HGB BLD-MCNC: 11.8 G/DL — SIGNIFICANT CHANGE UP (ref 11.5–15.5)
HGB BLD-MCNC: 11.8 G/DL — SIGNIFICANT CHANGE UP (ref 11.5–15.5)
MAGNESIUM SERPL-MCNC: 2 MG/DL — SIGNIFICANT CHANGE UP (ref 1.6–2.6)
MAGNESIUM SERPL-MCNC: 2 MG/DL — SIGNIFICANT CHANGE UP (ref 1.6–2.6)
MCHC RBC-ENTMCNC: 25.5 PG — LOW (ref 27–34)
MCHC RBC-ENTMCNC: 25.5 PG — LOW (ref 27–34)
MCHC RBC-ENTMCNC: 32.1 GM/DL — SIGNIFICANT CHANGE UP (ref 32–36)
MCHC RBC-ENTMCNC: 32.1 GM/DL — SIGNIFICANT CHANGE UP (ref 32–36)
MCV RBC AUTO: 79.7 FL — LOW (ref 80–100)
MCV RBC AUTO: 79.7 FL — LOW (ref 80–100)
NRBC # BLD: 0 /100 WBCS — SIGNIFICANT CHANGE UP (ref 0–0)
NRBC # BLD: 0 /100 WBCS — SIGNIFICANT CHANGE UP (ref 0–0)
NRBC # FLD: 0 K/UL — SIGNIFICANT CHANGE UP (ref 0–0)
NRBC # FLD: 0 K/UL — SIGNIFICANT CHANGE UP (ref 0–0)
PHOSPHATE SERPL-MCNC: 2.8 MG/DL — SIGNIFICANT CHANGE UP (ref 2.5–4.5)
PHOSPHATE SERPL-MCNC: 2.8 MG/DL — SIGNIFICANT CHANGE UP (ref 2.5–4.5)
PLATELET # BLD AUTO: 397 K/UL — SIGNIFICANT CHANGE UP (ref 150–400)
PLATELET # BLD AUTO: 397 K/UL — SIGNIFICANT CHANGE UP (ref 150–400)
POTASSIUM SERPL-MCNC: 3.7 MMOL/L — SIGNIFICANT CHANGE UP (ref 3.5–5.3)
POTASSIUM SERPL-MCNC: 3.7 MMOL/L — SIGNIFICANT CHANGE UP (ref 3.5–5.3)
POTASSIUM SERPL-SCNC: 3.7 MMOL/L — SIGNIFICANT CHANGE UP (ref 3.5–5.3)
POTASSIUM SERPL-SCNC: 3.7 MMOL/L — SIGNIFICANT CHANGE UP (ref 3.5–5.3)
RBC # BLD: 4.62 M/UL — SIGNIFICANT CHANGE UP (ref 3.8–5.2)
RBC # BLD: 4.62 M/UL — SIGNIFICANT CHANGE UP (ref 3.8–5.2)
RBC # FLD: 16 % — HIGH (ref 10.3–14.5)
RBC # FLD: 16 % — HIGH (ref 10.3–14.5)
SODIUM SERPL-SCNC: 136 MMOL/L — SIGNIFICANT CHANGE UP (ref 135–145)
SODIUM SERPL-SCNC: 136 MMOL/L — SIGNIFICANT CHANGE UP (ref 135–145)
WBC # BLD: 5.41 K/UL — SIGNIFICANT CHANGE UP (ref 3.8–10.5)
WBC # BLD: 5.41 K/UL — SIGNIFICANT CHANGE UP (ref 3.8–10.5)
WBC # FLD AUTO: 5.41 K/UL — SIGNIFICANT CHANGE UP (ref 3.8–10.5)
WBC # FLD AUTO: 5.41 K/UL — SIGNIFICANT CHANGE UP (ref 3.8–10.5)

## 2023-12-25 PROCEDURE — 95720 EEG PHY/QHP EA INCR W/VEEG: CPT

## 2023-12-25 PROCEDURE — 99232 SBSQ HOSP IP/OBS MODERATE 35: CPT

## 2023-12-25 NOTE — DISCHARGE NOTE PROVIDER - NSFOLLOWUPCLINICS_GEN_ALL_ED_FT
[facility]  Neurology Epilepsy  [address] [place] [address], [address] [place] [id number]  Phone: [phone]  Fax: [phone]     [facility]  Neurology Epilepsy  [address] [place] [address], [address] [place] [id number]  Phone: [phone]  Fax: [phone]

## 2023-12-25 NOTE — DISCHARGE NOTE PROVIDER - HOSPITAL COURSE
Name Change: Ms. Hewitt is a 48F no significant PMH presenting with an episode of seizure-like activity, admitted for seizure workup. Pending EEG and MR brain. Name Change: Ms. Hewitt is a 48F no significant PMH presenting with an episode of seizure-like activity, admitted for seizure workup. VEEG noted epileptic foci and multiple seizure activities during time of monitor. she was started on Keppra which showed improvement in seizure activity. MRI is recommended, but this can be performed outpatient with neurology. She is medically stable for discharge home today. Name Change: Ms. Hewitt is a 48F no significant PMH presenting with an episode of seizure-like activity, admitted for seizure workup. VEEG noted epileptic foci in the frontotemporal region, and multiple seizure activities during time of monitor. she was started on Keppra which showed improvement in seizure activity. MRI is recommended, but this can be performed outpatient with neurology. She is medically stable for discharge home today.

## 2023-12-25 NOTE — PROGRESS NOTE ADULT - TIME BILLING
Time-based billing (NON-critical care).     More than 50% of the visit was spent counseling and / or coordinating care by the attending physician.      The necessity of the time spent during the encounter on this date of service was due to: documentation in [place], reviewing chart and coordinating care with patient/ACPs and interdisciplinary staff (such as , social workers, etc) as well as reviewing vitals, laboratory data, radiology, medication list, consultants' recommendations and prior records. Interventions were performed as documented above. Time-based billing (NON-critical care).     More than 50% of the visit was spent counseling and / or coordinating care by the attending physician.      The necessity of the time spent during the encounter on this date of service was due to: documentation in [place], reviewing chart and coordinating care with patient/ACPs and interdisciplinary staff (such as , social workers, etc) as well as reviewing vitals, laboratory data, radiology, medication list, consultants' recommendations and prior records. Interventions were performed as documented above.

## 2023-12-25 NOTE — PROGRESS NOTE ADULT - SUBJECTIVE AND OBJECTIVE BOX
John Torres MD  Division of Hospitalist Medicine  Pager [phone]  Available on Teams    CC: Patient is a 48y old  Female who presents with a chief complaint of episode of unresponsiveness (24 Dec 2023 14:23)      INTERVAL EVENTS: ROBERT    SUBJECTIVE / INTERVAL HPI: Patient seen and examined at bedside. Pt reports new posterior headache that she thinks is due to stress. Denies other new complaints. No focal neuro deficits.    ROS: negative unless otherwise stated above.    VITAL SIGNS:  Vital Signs Last 24 Hrs  T(C): 36.5 (25 Dec 2023 06:00), Max: 36.5 (25 Dec 2023 06:00)  T(F): 97.7 (25 Dec 2023 06:00), Max: 97.7 (25 Dec 2023 06:00)  HR: 73 (25 Dec 2023 06:00) (67 - 73)  BP: 111/75 (25 Dec 2023 06:00) (108/57 - 111/75)  BP(mean): --  RR: 17 (25 Dec 2023 06:00) (17 - 17)  SpO2: 99% (25 Dec 2023 06:00) (98% - 99%)    Parameters below as of 25 Dec 2023 06:00  Patient On (Oxygen Delivery Method): room air            PHYSICAL EXAM:    General: NAD  HEENT: MMM  Neck: supple  Cardiovascular: +S1/S2; RRR  Respiratory: CTA B/L; no W/R/R  Gastrointestinal: soft, NT/ND  Extremities: WWP; no edema, clubbing or cyanosis  Vascular: palpable radial, DP pulses B/L  Neurological: AAOx3; no focal deficits    MEDICATIONS:  MEDICATIONS  (STANDING):  enoxaparin Injectable 40 milliGRAM(s) SubCutaneous every 24 hours    MEDICATIONS  (PRN):  acetaminophen     Tablet .. 650 milliGRAM(s) Oral every 6 hours PRN Temp greater or equal to 38C (100.4F), Mild Pain (1 - 3)  aluminum hydroxide/magnesium hydroxide/simethicone Suspension 30 milliLiter(s) Oral every 4 hours PRN Dyspepsia  melatonin 3 milliGRAM(s) Oral at bedtime PRN Insomnia  ondansetron Injectable 4 milliGRAM(s) IV Push every 8 hours PRN Nausea and/or Vomiting      ALLERGIES:  Allergies    No Known Allergies    Intolerances        LABS:                        11.8   5.41  )-----------( 397      ( 25 Dec 2023 05:49 )             36.8     12-25    136  |  103  |  12  ----------------------------<  99  3.7   |  23  |  0.53    Ca    9.2      25 Dec 2023 05:49  Phos  2.8     12-25  Mg     2.00     12-25        Urinalysis Basic - ( 25 Dec 2023 05:49 )    Color: x / Appearance: x / SG: x / pH: x  Gluc: 99 mg/dL / Ketone: x  / Bili: x / Urobili: x   Blood: x / Protein: x / Nitrite: x   Leuk Esterase: x / RBC: x / WBC x   Sq Epi: x / Non Sq Epi: x / Bacteria: x      CAPILLARY BLOOD GLUCOSE          RADIOLOGY & ADDITIONAL TESTS: Reviewed. John Torres MD  Division of Hospitalist Medicine  Pager [phone]  Available on Teams    CC: Patient is a 48y old  Female who presents with a chief complaint of episode of unresponsiveness (24 Dec 2023 14:23)      INTERVAL EVENTS: ROBERT    SUBJECTIVE / INTERVAL HPI: Patient seen and examined at bedside. Pt reports new posterior headache that she thinks is due to stress. Denies other new complaints. No focal neuro deficits.    ROS: negative unless otherwise stated above.    VITAL SIGNS:  Vital Signs Last 24 Hrs  T(C): 36.5 (25 Dec 2023 06:00), Max: 36.5 (25 Dec 2023 06:00)  T(F): 97.7 (25 Dec 2023 06:00), Max: 97.7 (25 Dec 2023 06:00)  HR: 73 (25 Dec 2023 06:00) (67 - 73)  BP: 111/75 (25 Dec 2023 06:00) (108/57 - 111/75)  BP(mean): --  RR: 17 (25 Dec 2023 06:00) (17 - 17)  SpO2: 99% (25 Dec 2023 06:00) (98% - 99%)    Parameters below as of 25 Dec 2023 06:00  Patient On (Oxygen Delivery Method): room air            PHYSICAL EXAM:    General: NAD  HEENT: MMM  Neck: supple  Cardiovascular: +S1/S2; RRR  Respiratory: CTA B/L; no W/R/R  Gastrointestinal: soft, NT/ND  Extremities: WWP; no edema, clubbing or cyanosis  Vascular: palpable radial, DP pulses B/L  Neurological: AAOx3; no focal deficits    MEDICATIONS:  MEDICATIONS  (STANDING):  enoxaparin Injectable 40 milliGRAM(s) SubCutaneous every 24 hours    MEDICATIONS  (PRN):  acetaminophen     Tablet .. 650 milliGRAM(s) Oral every 6 hours PRN Temp greater or equal to 38C (100.4F), Mild Pain (1 - 3)  aluminum hydroxide/magnesium hydroxide/simethicone Suspension 30 milliLiter(s) Oral every 4 hours PRN Dyspepsia  melatonin 3 milliGRAM(s) Oral at bedtime PRN Insomnia  ondansetron Injectable 4 milliGRAM(s) IV Push every 8 hours PRN Nausea and/or Vomiting      ALLERGIES:  Allergies    No Known Allergies    Intolerances        LABS:                        11.8   5.41  )-----------( 397      ( 25 Dec 2023 05:49 )             36.8     12-25    136  |  103  |  12  ----------------------------<  99  3.7   |  23  |  0.53    Ca    9.2      25 Dec 2023 05:49  Phos  2.8     12-25  Mg     2.00     12-25        Urinalysis Basic - ( 25 Dec 2023 05:49 )    Color: x / Appearance: x / SG: x / pH: x  Gluc: 99 mg/dL / Ketone: x  / Bili: x / Urobili: x   Blood: x / Protein: x / Nitrite: x   Leuk Esterase: x / RBC: x / WBC x   Sq Epi: x / Non Sq Epi: x / Bacteria: x      CAPILLARY BLOOD GLUCOSE          RADIOLOGY & ADDITIONAL TESTS: Reviewed.

## 2023-12-25 NOTE — CHART NOTE - NSCHARTNOTEFT_GEN_A_CORE
EEG preliminary read (not final) on the initial recording hour(s) = x 1.5    No seizures recorded.  Left frontotemporal sharp waves indicate risk for seizures from this region.     Final report to follow tomorrow morning after completion of study.    [facility] EEG Reading Room Ph#: [phone]  Epilepsy Answering Service after 5PM and before 8:30AM: Ph#: [phone] EEG preliminary read (not final) on the initial recording hour(s) = x 1.5    No seizures recorded.  Left frontotemporal sharp waves indicate risk for seizures from this region.     Final report to follow tomorrow morning after completion of study.    [facility] EEG Reading Room Ph#: [phone]  Epilepsy Answering Service after 5PM and before 8:30AM: Ph#: [phone]

## 2023-12-25 NOTE — DISCHARGE NOTE PROVIDER - NSDCFUADDAPPT_GEN_ALL_CORE_FT
Follow up with neurology outpatient for a MRI  DO NOT DRIVE! Follow up with neurology outpatient for a MRI  DO NOT DRIVE, operate heavy machinery, avoid heights, pools, bathtubs, locked doors until cleared by further follow up outpatient by neurology

## 2023-12-25 NOTE — DISCHARGE NOTE PROVIDER - CARE PROVIDER_API CALL
Fernando Dumont  [address] [facility] [place] [address]  [place] [address]  Phone: ([phone]  Fax: ()-  Follow Up Time:    Fernando Dumont  [address] [facility] [address]  [place] [address]  Phone: ()-  Fax: ()-  Follow Up Time:

## 2023-12-25 NOTE — DISCHARGE NOTE PROVIDER - NSDCCPCAREPLAN_GEN_ALL_CORE_FT
PRINCIPAL DISCHARGE DIAGNOSIS  Diagnosis: New onset seizure  Assessment and Plan of Treatment: You were noted to have seizure on EEG. medication was started to control the seizure which showed response on follow up EEG. You need to follow up with neurologist in office to continue management and workup for the seizure. do not drive, operate heavy machinery, avoid heights, pools, bathtubs, locked doors until cleared by further follow up outpatient by neurology

## 2023-12-25 NOTE — DISCHARGE NOTE PROVIDER - ATTENDING DISCHARGE PHYSICAL EXAMINATION:
Physical Exam:  GENERAL: no apparent distress  HEAD:  Atraumatic, Normocephalic  EYES: EOMI, PERRLA, conjunctiva and sclera clear b/l  CHEST/LUNG: on RA; Clear to auscultation bilaterally; No wheezing; No crackles  HEART: Regular rate and rhythm; S1/S2 wnl; no obvious murmurs  ABDOMEN: Soft, Nontender, Nondistended; Bowel sounds present  EXTREMITIES:  2+ Peripheral Pulses, No edema  PSYCH: normal affect, calm demeanor  NEUROLOGY: AAOX3, CN 2-12 grossly intact, no obvious FND

## 2023-12-26 DIAGNOSIS — R82.90 UNSPECIFIED ABNORMAL FINDINGS IN URINE: ICD-10-CM

## 2023-12-26 LAB
ANION GAP SERPL CALC-SCNC: 12 MMOL/L — SIGNIFICANT CHANGE UP (ref 7–14)
ANION GAP SERPL CALC-SCNC: 12 MMOL/L — SIGNIFICANT CHANGE UP (ref 7–14)
BUN SERPL-MCNC: 14 MG/DL — SIGNIFICANT CHANGE UP (ref 7–23)
BUN SERPL-MCNC: 14 MG/DL — SIGNIFICANT CHANGE UP (ref 7–23)
CALCIUM SERPL-MCNC: 9.1 MG/DL — SIGNIFICANT CHANGE UP (ref 8.4–10.5)
CALCIUM SERPL-MCNC: 9.1 MG/DL — SIGNIFICANT CHANGE UP (ref 8.4–10.5)
CHLORIDE SERPL-SCNC: 104 MMOL/L — SIGNIFICANT CHANGE UP (ref 98–107)
CHLORIDE SERPL-SCNC: 104 MMOL/L — SIGNIFICANT CHANGE UP (ref 98–107)
CO2 SERPL-SCNC: 23 MMOL/L — SIGNIFICANT CHANGE UP (ref 22–31)
CO2 SERPL-SCNC: 23 MMOL/L — SIGNIFICANT CHANGE UP (ref 22–31)
CREAT SERPL-MCNC: 0.48 MG/DL — LOW (ref 0.5–1.3)
CREAT SERPL-MCNC: 0.48 MG/DL — LOW (ref 0.5–1.3)
EGFR: 116 ML/MIN/1.73M2 — SIGNIFICANT CHANGE UP
EGFR: 116 ML/MIN/1.73M2 — SIGNIFICANT CHANGE UP
GLUCOSE SERPL-MCNC: 80 MG/DL — SIGNIFICANT CHANGE UP (ref 70–99)
GLUCOSE SERPL-MCNC: 80 MG/DL — SIGNIFICANT CHANGE UP (ref 70–99)
HCT VFR BLD CALC: 35.4 % — SIGNIFICANT CHANGE UP (ref 34.5–45)
HCT VFR BLD CALC: 35.4 % — SIGNIFICANT CHANGE UP (ref 34.5–45)
HGB BLD-MCNC: 11.3 G/DL — LOW (ref 11.5–15.5)
HGB BLD-MCNC: 11.3 G/DL — LOW (ref 11.5–15.5)
MAGNESIUM SERPL-MCNC: 1.8 MG/DL — SIGNIFICANT CHANGE UP (ref 1.6–2.6)
MAGNESIUM SERPL-MCNC: 1.8 MG/DL — SIGNIFICANT CHANGE UP (ref 1.6–2.6)
MCHC RBC-ENTMCNC: 25.5 PG — LOW (ref 27–34)
MCHC RBC-ENTMCNC: 25.5 PG — LOW (ref 27–34)
MCHC RBC-ENTMCNC: 31.9 GM/DL — LOW (ref 32–36)
MCHC RBC-ENTMCNC: 31.9 GM/DL — LOW (ref 32–36)
MCV RBC AUTO: 79.9 FL — LOW (ref 80–100)
MCV RBC AUTO: 79.9 FL — LOW (ref 80–100)
NRBC # BLD: 0 /100 WBCS — SIGNIFICANT CHANGE UP (ref 0–0)
NRBC # BLD: 0 /100 WBCS — SIGNIFICANT CHANGE UP (ref 0–0)
NRBC # FLD: 0 K/UL — SIGNIFICANT CHANGE UP (ref 0–0)
NRBC # FLD: 0 K/UL — SIGNIFICANT CHANGE UP (ref 0–0)
PHOSPHATE SERPL-MCNC: 2.5 MG/DL — SIGNIFICANT CHANGE UP (ref 2.5–4.5)
PHOSPHATE SERPL-MCNC: 2.5 MG/DL — SIGNIFICANT CHANGE UP (ref 2.5–4.5)
PLATELET # BLD AUTO: 381 K/UL — SIGNIFICANT CHANGE UP (ref 150–400)
PLATELET # BLD AUTO: 381 K/UL — SIGNIFICANT CHANGE UP (ref 150–400)
POTASSIUM SERPL-MCNC: 3.8 MMOL/L — SIGNIFICANT CHANGE UP (ref 3.5–5.3)
POTASSIUM SERPL-MCNC: 3.8 MMOL/L — SIGNIFICANT CHANGE UP (ref 3.5–5.3)
POTASSIUM SERPL-SCNC: 3.8 MMOL/L — SIGNIFICANT CHANGE UP (ref 3.5–5.3)
POTASSIUM SERPL-SCNC: 3.8 MMOL/L — SIGNIFICANT CHANGE UP (ref 3.5–5.3)
RBC # BLD: 4.43 M/UL — SIGNIFICANT CHANGE UP (ref 3.8–5.2)
RBC # BLD: 4.43 M/UL — SIGNIFICANT CHANGE UP (ref 3.8–5.2)
RBC # FLD: 15.9 % — HIGH (ref 10.3–14.5)
RBC # FLD: 15.9 % — HIGH (ref 10.3–14.5)
SODIUM SERPL-SCNC: 139 MMOL/L — SIGNIFICANT CHANGE UP (ref 135–145)
SODIUM SERPL-SCNC: 139 MMOL/L — SIGNIFICANT CHANGE UP (ref 135–145)
WBC # BLD: 4.75 K/UL — SIGNIFICANT CHANGE UP (ref 3.8–10.5)
WBC # BLD: 4.75 K/UL — SIGNIFICANT CHANGE UP (ref 3.8–10.5)
WBC # FLD AUTO: 4.75 K/UL — SIGNIFICANT CHANGE UP (ref 3.8–10.5)
WBC # FLD AUTO: 4.75 K/UL — SIGNIFICANT CHANGE UP (ref 3.8–10.5)

## 2023-12-26 PROCEDURE — 95720 EEG PHY/QHP EA INCR W/VEEG: CPT

## 2023-12-26 PROCEDURE — 99233 SBSQ HOSP IP/OBS HIGH 50: CPT

## 2023-12-26 RX ORDER — LEVETIRACETAM 250 MG/1
500 TABLET, FILM COATED ORAL ONCE
Refills: 0 | Status: COMPLETED | OUTPATIENT
Start: 2023-12-26 | End: 2023-12-26

## 2023-12-26 RX ORDER — LEVETIRACETAM 250 MG/1
1000 TABLET, FILM COATED ORAL ONCE
Refills: 0 | Status: DISCONTINUED | OUTPATIENT
Start: 2023-12-26 | End: 2023-12-26

## 2023-12-26 RX ORDER — LEVETIRACETAM 250 MG/1
750 TABLET, FILM COATED ORAL
Refills: 0 | Status: DISCONTINUED | OUTPATIENT
Start: 2023-12-26 | End: 2023-12-27

## 2023-12-26 RX ORDER — LEVETIRACETAM 250 MG/1
500 TABLET, FILM COATED ORAL
Refills: 0 | Status: DISCONTINUED | OUTPATIENT
Start: 2023-12-26 | End: 2023-12-26

## 2023-12-26 RX ADMIN — LEVETIRACETAM 500 MILLIGRAM(S): 250 TABLET, FILM COATED ORAL at 10:10

## 2023-12-26 RX ADMIN — LEVETIRACETAM 1000 MILLIGRAM(S): 250 TABLET, FILM COATED ORAL at 11:28

## 2023-12-26 RX ADMIN — LEVETIRACETAM 750 MILLIGRAM(S): 250 TABLET, FILM COATED ORAL at 19:20

## 2023-12-26 NOTE — PROGRESS NOTE ADULT - PROBLEM SELECTOR PLAN 1
Last thing pt remembers before awakening in the hospital was sitting down. Pt was told she had episode of LOC.   - CTP  Tmax greater then 6 seconds in the high frontal region slightly left and in the posterior fossa at the level of the brachium pontis bilaterally likely artifact. "  - UTox negative  - VEEG showed 3 focal left posterior temporal onset seizures as above. Increased risk for focal seizures from frontotemporal/posterior temporal region Focal cerebral dysfunction over temporal region.    Plan:  - f/u MRI brain  - discussed with neurology. started on Keppra 750mg bid standing. continue VEEG. further plan pending VEEG findings.   - Seizure, fall and aspiration precautions

## 2023-12-26 NOTE — CHART NOTE - NSCHARTNOTEFT_GEN_A_CORE
received call from neuro on continuous EEG -> had  3 focal seizures on eeg; continue eeg until james 12/26 - started on keppra with 1gm load and 750mg bid   MRI brain w/wo gado -> tbd if needs to stay in the hospital for mri received call from neuro on continuous EEG -> had  3 focal seizures. continue eeg until james 12/26 - started on keppra with 1gm load and 750mg bid   MRI brain w/wo gado -> tbd if needs to stay in the hospital for mri received call from neuro on continuous EEG -> had  3 focal seizures. continue eeg until james 12/27 - started on keppra with 1gm load and 750mg bid   MRI brain w/wo gado -> tbd if needs to stay in the hospital for mri

## 2023-12-26 NOTE — PROGRESS NOTE ADULT - SUBJECTIVE AND OBJECTIVE BOX
[facility] Division of Hospital Medicine  Alexandre Ricks MD  In House Pager [phone]    Patient is a 48y old  Female who presents with a chief complaint of episode of unresponsiveness (26 Dec 2023 09:10)    SUBJECTIVE / OVERNIGHT EVENTS: no acute events. patient has no acute complaints. EEG noted episodes of seizure activity, patient denied any uncontrolled movement or LOC. she also denied any urinary symptoms such as dysuria or urinary frequency.     ROS: Denied Fever, Chill, CP, SOB, Abd pain, N/V/D, LE swelling or pain.     MEDICATIONS  (STANDING):  enoxaparin Injectable 40 milliGRAM(s) SubCutaneous every 24 hours  levETIRAcetam 750 milliGRAM(s) Oral two times a day    MEDICATIONS  (PRN):  acetaminophen     Tablet .. 650 milliGRAM(s) Oral every 6 hours PRN Temp greater or equal to 38C (100.4F), Mild Pain (1 - 3)  aluminum hydroxide/magnesium hydroxide/simethicone Suspension 30 milliLiter(s) Oral every 4 hours PRN Dyspepsia  melatonin 3 milliGRAM(s) Oral at bedtime PRN Insomnia  ondansetron Injectable 4 milliGRAM(s) IV Push every 8 hours PRN Nausea and/or Vomiting    CAPILLARY BLOOD GLUCOSE        I&O's Summary      Vital Signs Last 24 Hrs  T(C): 36.9 (26 Dec 2023 11:12), Max: 37.1 (25 Dec 2023 21:13)  T(F): 98.4 (26 Dec 2023 11:12), Max: 98.8 (25 Dec 2023 21:13)  HR: 105 (26 Dec 2023 11:12) (57 - 105)  BP: 124/38 (26 Dec 2023 11:12) (95/56 - 124/38)  BP(mean): --  RR: 17 (26 Dec 2023 11:12) (17 - 18)  SpO2: 98% (26 Dec 2023 11:12) (98% - 100%)    Parameters below as of 26 Dec 2023 06:03  Patient On (Oxygen Delivery Method): room air    Physical Exam:  GENERAL: no apparent distress; on RA; calm  CHEST/LUNG: Clear to auscultation bilaterally; No wheezing; No crackles  HEART: no obvious audible murmurs; ext warm to touch; No edema  ABDOMEN: Soft, Nontender, Nondistended; Bowel sounds present  MSK: no joint or back on palpation; no joint erythema or swelling.   NEUROLOGY: Awake and alert; CN 2-12 grossly intact, no obvious FND     LABS:                        11.3   4.75  )-----------( 381      ( 26 Dec 2023 06:10 )             35.4     12-26    139  |  104  |  14  ----------------------------<  80  3.8   |  23  |  0.48<L>    Ca    9.1      26 Dec 2023 06:10  Phos  2.5     12-26  Mg     1.80     12-26            Urinalysis Basic - ( 26 Dec 2023 06:10 )    Color: x / Appearance: x / SG: x / pH: x  Gluc: 80 mg/dL / Ketone: x  / Bili: x / Urobili: x   Blood: x / Protein: x / Nitrite: x   Leuk Esterase: x / RBC: x / WBC x   Sq Epi: x / Non Sq Epi: x / Bacteria: x        Culture - Urine (collected 23 Dec 2023 15:25)  Source: Clean Catch Clean Catch (Midstream)  Preliminary Report (25 Dec 2023 14:47):    >100,000 CFU/ml Gram Negative Rods      RADIOLOGY & ADDITIONAL TESTS:  Results Reviewed: Y  Imaging Personally Reviewed: Y  Electrocardiogram Personally Reviewed: Y    COORDINATION OF CARE:  Care Discussed with Consultants/Other Providers [Y/N]: Y  Prior or Outpatient Records Reviewed [Y/N]: Y   [facility] Division of Hospital Medicine  Alexandre Ricks MD  In House Pager [phone]    Patient is a 48y old  Female who presents with a chief complaint of episode of unresponsiveness (26 Dec 2023 09:10)    SUBJECTIVE / OVERNIGHT EVENTS: no acute events. patient has no acute complaints. EEG noted episodes of seizure activity, patient denied any uncontrolled movement or LOC. she also denied any urinary symptoms such as dysuria or urinary frequency.     ROS: Denied Fever, Chill, CP, SOB, Abd pain, N/V/D, LE swelling or pain.     MEDICATIONS  (STANDING):  enoxaparin Injectable 40 milliGRAM(s) SubCutaneous every 24 hours  levETIRAcetam 750 milliGRAM(s) Oral two times a day    MEDICATIONS  (PRN):  acetaminophen     Tablet .. 650 milliGRAM(s) Oral every 6 hours PRN Temp greater or equal to 38C (100.4F), Mild Pain (1 - 3)  aluminum hydroxide/magnesium hydroxide/simethicone Suspension 30 milliLiter(s) Oral every 4 hours PRN Dyspepsia  melatonin 3 milliGRAM(s) Oral at bedtime PRN Insomnia  ondansetron Injectable 4 milliGRAM(s) IV Push every 8 hours PRN Nausea and/or Vomiting    CAPILLARY BLOOD GLUCOSE        I&O's Summary      Vital Signs Last 24 Hrs  T(C): 36.9 (26 Dec 2023 11:12), Max: 37.1 (25 Dec 2023 21:13)  T(F): 98.4 (26 Dec 2023 11:12), Max: 98.8 (25 Dec 2023 21:13)  HR: 105 (26 Dec 2023 11:12) (57 - 105)  BP: 124/38 (26 Dec 2023 11:12) (95/56 - 124/38)  BP(mean): --  RR: 17 (26 Dec 2023 11:12) (17 - 18)  SpO2: 98% (26 Dec 2023 11:12) (98% - 100%)    Parameters below as of 26 Dec 2023 06:03  Patient On (Oxygen Delivery Method): room air    Physical Exam:  GENERAL: no apparent distress; on RA; calm  CHEST/LUNG: Clear to auscultation bilaterally; No wheezing; No crackles  HEART: no obvious audible murmurs; ext warm to touch; No edema  ABDOMEN: Soft, Nontender, Nondistended; Bowel sounds present  MSK: no joint or back on palpation; no joint erythema or swelling.   NEUROLOGY: Awake and alert; CN 2-12 grossly intact, no obvious FND     LABS:                        11.3   4.75  )-----------( 381      ( 26 Dec 2023 06:10 )             35.4     12-26    139  |  104  |  14  ----------------------------<  80  3.8   |  23  |  0.48<L>    Ca    9.1      26 Dec 2023 06:10  Phos  2.5     12-26  Mg     1.80     12-26            Urinalysis Basic - ( 26 Dec 2023 06:10 )    Color: x / Appearance: x / SG: x / pH: x  Gluc: 80 mg/dL / Ketone: x  / Bili: x / Urobili: x   Blood: x / Protein: x / Nitrite: x   Leuk Esterase: x / RBC: x / WBC x   Sq Epi: x / Non Sq Epi: x / Bacteria: x        Culture - Urine (collected 23 Dec 2023 15:25)  Source: Clean Catch Clean Catch (Midstream)  Preliminary Report (25 Dec 2023 14:47):    >100,000 CFU/ml Gram Negative Rods      RADIOLOGY & ADDITIONAL TESTS:  Results Reviewed: Y  Imaging Personally Reviewed: Y  Electrocardiogram Personally Reviewed: Y    COORDINATION OF CARE:  Care Discussed with Consultants/Other Providers [Y/N]: Y  Prior or Outpatient Records Reviewed [Y/N]: Y

## 2023-12-26 NOTE — PROGRESS NOTE ADULT - SUBJECTIVE AND OBJECTIVE BOX
Neurology - Progress Note    -  Spectra: [id number] ([facility]), [phone] ([facility])  -    RALPH BATES is a 48y (1975) woman with unclear PMH presenting as code stroke for decreased verbal output. 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 Her eyes then rolled back and she was foaming out of her mouth.    Interval History: No acute events overnight.       Review of Systems:   All other review of systems is negative unless indicated above.    Allergies:  No Known Allergies      PMHx/PSHx/Family Hx: As above, otherwise see below   No pertinent past medical history    History of abnormal Pap smear        Social Hx:  No current use of tobacco, alcohol, or illicit drugs      Medications:  MEDICATIONS  (STANDING):  enoxaparin Injectable 40 milliGRAM(s) SubCutaneous every 24 hours  levETIRAcetam 500 milliGRAM(s) Oral two times a day    MEDICATIONS  (PRN):  acetaminophen     Tablet .. 650 milliGRAM(s) Oral every 6 hours PRN Temp greater or equal to 38C (100.4F), Mild Pain (1 - 3)  aluminum hydroxide/magnesium hydroxide/simethicone Suspension 30 milliLiter(s) Oral every 4 hours PRN Dyspepsia  melatonin 3 milliGRAM(s) Oral at bedtime PRN Insomnia  ondansetron Injectable 4 milliGRAM(s) IV Push every 8 hours PRN Nausea and/or Vomiting      Vitals:  T(C): 36.8 (12-26-23 @ 06:03), Max: 37.1 (12-25-23 @ 21:13)  HR: 65 (12-26-23 @ 06:03) (57 - 72)  BP: 95/56 (12-26-23 @ 06:03) (95/56 - 107/65)  RR: 18 (12-26-23 @ 06:03) (18 - 18)  SpO2: 98% (12-26-23 @ 06:03) (96% - 100%)    Physical Examination:   General - NAD    Neurologic Exam:  Mental status - Awake, Alert, Oriented. Speech clear, fluent, repetition and able to name 1 out of 2 items (unable to name elbow). Follows simple and complex commands.    Cranial nerves - VFF, EOMI, facial strength intact without asymmetry b/l, hearing intact b/l    Motor -   Strength testing  Moving all extremities antigravity and symmetrically       Coordination -  No tremors appreciated    Gait and station - Deffered     Labs:                        11.3   4.75  )-----------( 381      ( 26 Dec 2023 06:10 )             35.4     12-26    139  |  104  |  14  ----------------------------<  80  3.8   |  23  |  0.48<L>    Ca    9.1      26 Dec 2023 06:10  Phos  2.5     12-26  Mg     1.80     12-26      CAPILLARY BLOOD GLUCOSE            Culture - Urine (collected 23 Dec 2023 15:25)  Source: Clean Catch Clean Catch (Midstream)  Preliminary Report (25 Dec 2023 14:47):    >100,000 CFU/ml Gram Negative Rods              Radiology:  HEAD CT AND RAPID PERFUSION:    HEAD CT: Mild volume loss, microvascular disease, no acute hemorrhage or   midline shift.    CT PERFUSION demonstrated: Tmax greater then 6 seconds in the high   frontal region slightly left and in the posterior fossa at the level of   the brachium pontis bilaterally. Unclear if this is artifactual    If symptoms persist consider follow up head CT or MRI, MRAif no   contraindication.    CTA COW:  Patent intracranial circulation without flow limiting stenosis    CTA NECK: Patent, ECAs, ICAs, no  hemodynamically significant stenosis at    ICA origins by NASCET criteria.  Bilateral vertebral arteries are patent without flow limiting stenosis.    EEG:  No seizures recorded.  Left frontotemporal sharp waves indicate risk for seizures from this region.    Neurology - Progress Note    -  Spectra: [id number] ([facility]), [phone] ([facility])  -    RALPH BATES is a 48y (1975) woman with unclear PMH presenting as code stroke for decreased verbal output. 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 Her eyes then rolled back and she was foaming out of her mouth.    Interval History: No acute events overnight.       Review of Systems:   All other review of systems is negative unless indicated above.    Allergies:  No Known Allergies      PMHx/PSHx/Family Hx: As above, otherwise see below   No pertinent past medical history    History of abnormal Pap smear        Social Hx:  No current use of tobacco, alcohol, or illicit drugs      Medications:  MEDICATIONS  (STANDING):  enoxaparin Injectable 40 milliGRAM(s) SubCutaneous every 24 hours  levETIRAcetam 500 milliGRAM(s) Oral two times a day    MEDICATIONS  (PRN):  acetaminophen     Tablet .. 650 milliGRAM(s) Oral every 6 hours PRN Temp greater or equal to 38C (100.4F), Mild Pain (1 - 3)  aluminum hydroxide/magnesium hydroxide/simethicone Suspension 30 milliLiter(s) Oral every 4 hours PRN Dyspepsia  melatonin 3 milliGRAM(s) Oral at bedtime PRN Insomnia  ondansetron Injectable 4 milliGRAM(s) IV Push every 8 hours PRN Nausea and/or Vomiting      Vitals:  T(C): 36.8 (12-26-23 @ 06:03), Max: 37.1 (12-25-23 @ 21:13)  HR: 65 (12-26-23 @ 06:03) (57 - 72)  BP: 95/56 (12-26-23 @ 06:03) (95/56 - 107/65)  RR: 18 (12-26-23 @ 06:03) (18 - 18)  SpO2: 98% (12-26-23 @ 06:03) (96% - 100%)    Physical Examination:   General - NAD    Neurologic Exam:  Mental status - Awake, Alert, Oriented. Speech clear, fluent, repetition and able to name 1 out of 2 items (unable to name elbow). Follows simple and complex commands.    Cranial nerves - VFF, EOMI, facial strength intact without asymmetry b/l, hearing intact b/l    Motor -   Strength testing  Moving all extremities antigravity and symmetrically       Coordination -  No tremors appreciated    Gait and station - Deffered     Labs:                        11.3   4.75  )-----------( 381      ( 26 Dec 2023 06:10 )             35.4     12-26    139  |  104  |  14  ----------------------------<  80  3.8   |  23  |  0.48<L>    Ca    9.1      26 Dec 2023 06:10  Phos  2.5     12-26  Mg     1.80     12-26      CAPILLARY BLOOD GLUCOSE            Culture - Urine (collected 23 Dec 2023 15:25)  Source: Clean Catch Clean Catch (Midstream)  Preliminary Report (25 Dec 2023 14:47):    >100,000 CFU/ml Gram Negative Rods              Radiology:  HEAD CT AND RAPID PERFUSION:    HEAD CT: Mild volume loss, microvascular disease, no acute hemorrhage or   midline shift.    CT PERFUSION demonstrated: Tmax greater then 6 seconds in the high   frontal region slightly left and in the posterior fossa at the level of   the brachium pontis bilaterally. Unclear if this is artifactual    If symptoms persist consider follow up head CT or MRI, MRAif no   contraindication.    CTA COW:  Patent intracranial circulation without flow limiting stenosis    CTA NECK: Patent, ECAs, ICAs, no  hemodynamically significant stenosis at    ICA origins by NASCET criteria.  Bilateral vertebral arteries are patent without flow limiting stenosis.    EEG:  No seizures recorded.  Left frontotemporal sharp waves indicate risk for seizures from this region.    Neurology - Progress Note    -  Spectra: [phone] ([facility]), [phone] ([facility])  -    RALPH BATES is a 48y (1975) woman with unclear PMH presenting as code stroke for decreased verbal output. 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 Her eyes then rolled back and she was foaming out of her mouth.    Interval History: No acute events overnight.       Review of Systems:   All other review of systems is negative unless indicated above.    Allergies:  No Known Allergies      PMHx/PSHx/Family Hx: As above, otherwise see below   No pertinent past medical history    History of abnormal Pap smear        Social Hx:  No current use of tobacco, alcohol, or illicit drugs      Medications:  MEDICATIONS  (STANDING):  enoxaparin Injectable 40 milliGRAM(s) SubCutaneous every 24 hours  levETIRAcetam 500 milliGRAM(s) Oral two times a day    MEDICATIONS  (PRN):  acetaminophen     Tablet .. 650 milliGRAM(s) Oral every 6 hours PRN Temp greater or equal to 38C (100.4F), Mild Pain (1 - 3)  aluminum hydroxide/magnesium hydroxide/simethicone Suspension 30 milliLiter(s) Oral every 4 hours PRN Dyspepsia  melatonin 3 milliGRAM(s) Oral at bedtime PRN Insomnia  ondansetron Injectable 4 milliGRAM(s) IV Push every 8 hours PRN Nausea and/or Vomiting      Vitals:  T(C): 36.8 (12-26-23 @ 06:03), Max: 37.1 (12-25-23 @ 21:13)  HR: 65 (12-26-23 @ 06:03) (57 - 72)  BP: 95/56 (12-26-23 @ 06:03) (95/56 - 107/65)  RR: 18 (12-26-23 @ 06:03) (18 - 18)  SpO2: 98% (12-26-23 @ 06:03) (96% - 100%)    Physical Examination:   General - NAD    Neurologic Exam:  Mental status - Awake, Alert, Oriented. Speech clear, fluent, repetition and able to name 1 out of 2 items (unable to name elbow). Follows simple and complex commands.    Cranial nerves - VFF, EOMI, facial strength intact without asymmetry b/l, hearing intact b/l    Motor -   Strength testing  Moving all extremities antigravity and symmetrically       Coordination -  No tremors appreciated    Gait and station - Deffered     Labs:                        11.3   4.75  )-----------( 381      ( 26 Dec 2023 06:10 )             35.4     12-26    139  |  104  |  14  ----------------------------<  80  3.8   |  23  |  0.48<L>    Ca    9.1      26 Dec 2023 06:10  Phos  2.5     12-26  Mg     1.80     12-26      CAPILLARY BLOOD GLUCOSE            Culture - Urine (collected 23 Dec 2023 15:25)  Source: Clean Catch Clean Catch (Midstream)  Preliminary Report (25 Dec 2023 14:47):    >100,000 CFU/ml Gram Negative Rods              Radiology:  HEAD CT AND RAPID PERFUSION:    HEAD CT: Mild volume loss, microvascular disease, no acute hemorrhage or   midline shift.    CT PERFUSION demonstrated: Tmax greater then 6 seconds in the high   frontal region slightly left and in the posterior fossa at the level of   the brachium pontis bilaterally. Unclear if this is artifactual    If symptoms persist consider follow up head CT or MRI, MRAif no   contraindication.    CTA COW:  Patent intracranial circulation without flow limiting stenosis    CTA NECK: Patent, ECAs, ICAs, no  hemodynamically significant stenosis at    ICA origins by NASCET criteria.  Bilateral vertebral arteries are patent without flow limiting stenosis.       Neurology - Progress Note    -  Spectra: [phone] ([facility]), [phone] ([facility])  -    RALPH BATES is a 48y (1975) woman with unclear PMH presenting as code stroke for decreased verbal output. 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 Her eyes then rolled back and she was foaming out of her mouth.    Interval History: No acute events overnight.       Review of Systems:   All other review of systems is negative unless indicated above.    Allergies:  No Known Allergies      PMHx/PSHx/Family Hx: As above, otherwise see below   No pertinent past medical history    History of abnormal Pap smear        Social Hx:  No current use of tobacco, alcohol, or illicit drugs      Medications:  MEDICATIONS  (STANDING):  enoxaparin Injectable 40 milliGRAM(s) SubCutaneous every 24 hours  levETIRAcetam 500 milliGRAM(s) Oral two times a day    MEDICATIONS  (PRN):  acetaminophen     Tablet .. 650 milliGRAM(s) Oral every 6 hours PRN Temp greater or equal to 38C (100.4F), Mild Pain (1 - 3)  aluminum hydroxide/magnesium hydroxide/simethicone Suspension 30 milliLiter(s) Oral every 4 hours PRN Dyspepsia  melatonin 3 milliGRAM(s) Oral at bedtime PRN Insomnia  ondansetron Injectable 4 milliGRAM(s) IV Push every 8 hours PRN Nausea and/or Vomiting      Vitals:  T(C): 36.8 (12-26-23 @ 06:03), Max: 37.1 (12-25-23 @ 21:13)  HR: 65 (12-26-23 @ 06:03) (57 - 72)  BP: 95/56 (12-26-23 @ 06:03) (95/56 - 107/65)  RR: 18 (12-26-23 @ 06:03) (18 - 18)  SpO2: 98% (12-26-23 @ 06:03) (96% - 100%)    Physical Examination:   General - NAD    Neurologic Exam:  Mental status - Awake, Alert, Oriented. Speech clear, fluent, repetition and able to name 1 out of 2 items (unable to name elbow). Follows simple and complex commands.    Cranial nerves - VFF, EOMI, facial strength intact without asymmetry b/l, hearing intact b/l    Motor -   Strength testing  Moving all extremities antigravity and symmetrically       Coordination -  No tremors appreciated    Gait and station - Deffered     Labs:                        11.3   4.75  )-----------( 381      ( 26 Dec 2023 06:10 )             35.4     12-26    139  |  104  |  14  ----------------------------<  80  3.8   |  23  |  0.48<L>    Ca    9.1      26 Dec 2023 06:10  Phos  2.5     12-26  Mg     1.80     12-26      CAPILLARY BLOOD GLUCOSE            Culture - Urine (collected 23 Dec 2023 15:25)  Source: Clean Catch Clean Catch (Midstream)  Preliminary Report (25 Dec 2023 14:47):    >100,000 CFU/ml Gram Negative Rods              Radiology:  HEAD CT AND RAPID PERFUSION:    HEAD CT: Mild volume loss, microvascular disease, no acute hemorrhage or   midline shift.    CT PERFUSION demonstrated: Tmax greater then 6 seconds in the high   frontal region slightly left and in the posterior fossa at the level of   the brachium pontis bilaterally. Unclear if this is artifactual    If symptoms persist consider follow up head CT or MRI, MRAif no   contraindication.    CTA COW:  Patent intracranial circulation without flow limiting stenosis    CTA NECK: Patent, ECAs, ICAs, no  hemodynamically significant stenosis at    ICA origins by NASCET criteria.  Bilateral vertebral arteries are patent without flow limiting stenosis.

## 2023-12-26 NOTE — EEG REPORT - NS EEG TEXT BOX
RALPH BATES [id number]     Study Date: 	12-25-23 1120 - 0800 12-26-23  Duration in hours:  x 20 hr 32 min    --------------------------------------------------------------------------------------------------  History:  CC/ HPI Patient is a 48y old  Female who presents with a chief complaint of episode of unresponsiveness (26 Dec 2023 09:10)    MEDICATIONS  (STANDING):  enoxaparin Injectable 40 milliGRAM(s) SubCutaneous every 24 hours  levETIRAcetam 500 milliGRAM(s) Oral two times a day    --------------------------------------------------------------------------------------------------  Study Interpretation:    [Abbreviation Key:  PDR=alpha rhythm/posterior dominant rhythm. A-P=anterior posterior.  Amplitude: ‘very low’:<20; ‘low’:20-49; ‘medium’:; ‘high’:>150uV.  Persistence for periodic/rhythmic patterns (% of epoch) ‘rare’:<1%; ‘occasional’:1-10%; ‘frequent’:10-50%; ‘abundant’:50-90%; ‘continuous’:>90%.  Persistence for sporadic discharges: ‘rare’:<1/hr; ‘occasional’:1/min-1/hr; ‘frequent’:>1/min; ‘abundant’:>1/10 sec.  RPP=rhythmic and periodic patterns; GRDA=generalized rhythmic delta activity; FIRDA=frontal intermittent GRDA; LRDA=lateralized rhythmic delta activity; TIRDA=temporal intermittent rhythmic delta activity;  LPD=PLED=lateralized periodic discharges; GPD=generalized periodic discharges; BIPDs =bilateral independent periodic discharges; Mf=multifocal; SIRPDs=stimulus induced rhythmic, periodic, or ictal appearing discharges; BIRDs=brief potentially ictal rhythmic discharges >4 Hz, lasting .5-10s; PFA (paroxysmal bursts >13 Hz or =8 Hz <10s).  Modifiers: +F=with fast component; +S=with spike component; +R=with rhythmic component.  S-B=burst suppression pattern.  Max=maximal. N1-drowsy; N2-stage II sleep; N3-slow wave sleep. SSS/BETS=small sharp spikes/benign epileptiform transients of sleep. HV=hyperventilation; PS=photic stimulation]    FINDINGS:      Background:  Continuity: continuous  Symmetry: symmetric  PDR: 10 Hz activity, with amplitude to 40 uV, that attenuated to eye opening.    Reactivity: present  Voltage: normal (between 20-150uV)  Anterior Posterior Gradient: present  Other background findings: none  Breach: absent    Background Slowing:  Generalized slowing: none   Focal slowing: left frontotemporal irregular delta/theta activity was present, intermittently increased rhythmicity.    State Changes:   -Drowsiness noted with increased slowing, attenuation of fast activity, vertex transients.  -Present with N2 sleep transients with symmetric spindles and K-complexes.    Sporadic/Rhythmic Epileptiform Discharges:    Frequent left frontotemporal (max F7/T7) spike wave discharges, intermittently with few seconds 0.5-1 Hz periodic pattern.    Electrographic and Electroclinical seizures:  3 focal left posterior temporal onset seizures (limited evaluation of the clinic due to no video available) with similar electrographic evolution pattern.     d1 20:41:50		Seizure onset - Left posterior temporal   d1 20:41:51		low amplitude delta with overlying alpha  d1 20:41:57		evolution in morphology and freq-sharply contoured rhythmic theta  d1 20:42:02		increased synchrony over RT  d1 20:42:10		evolution to delta  d1 20:42:17		offset (electrographic)    d2 00:22:32		Seizure onset  d2 00:22:59		offset (electrographic)    d2 07:40:25		Seizure onset  d2 07:41:01		offset (electrographic)    Other Clinical Events:  None    Activation Procedures:   -Hyperventilation was not performed.    -Photic stimulation was not performed.     Artifacts:  Intermittent myogenic and movement artifacts were noted.    ECG:  The heart rate on single channel ECG was predominantly between 60-70 BPM.    EEG Classification / Summary:  Abnormal EEG study  3 focal left posterior temporal onset seizures as above. Limited evaluation of the clinic due to no video available.  Frequent left frontotemporal (max F7/T7) spike wave discharges, intermittently with few seconds 0.5-1 Hz periodic pattern.  Left frontotemporal irregular delta/theta activity was present, intermittently increased rhythmicity.    -----------------------------------------------------------------------------------------------------    Clinical Impression:  3 focal left posterior temporal onset seizures as above.  Increased risk for focal seizures from frontotemporal/posterior temporal region  Focal cerebral dysfunction over temporal region.  This is a preliminary report pending attending review and attestation.    Lilliam Mcmahan MD  Fellow, [facility]      -------------------------------------------------------------------------------------------------------  [facility] EEG Reading Room Ph#: [phone]  Epilepsy Answering Service after 5PM and before 8:30AM: Ph#: [phone]   RALPH BATES [id number]     Study Date: 	12-25-23 1120 - 0800 12-26-23  Duration in hours:  x 20 hr 32 min    --------------------------------------------------------------------------------------------------  History:  CC/ HPI Patient is a 48y old  Female who presents with a chief complaint of episode of unresponsiveness (26 Dec 2023 09:10)    MEDICATIONS  (STANDING):  enoxaparin Injectable 40 milliGRAM(s) SubCutaneous every 24 hours  levETIRAcetam 500 milliGRAM(s) Oral two times a day    --------------------------------------------------------------------------------------------------  Study Interpretation:    [Abbreviation Key:  PDR=alpha rhythm/posterior dominant rhythm. A-P=anterior posterior.  Amplitude: ‘very low’:<20; ‘low’:20-49; ‘medium’:; ‘high’:>150uV.  Persistence for periodic/rhythmic patterns (% of epoch) ‘rare’:<1%; ‘occasional’:1-10%; ‘frequent’:10-50%; ‘abundant’:50-90%; ‘continuous’:>90%.  Persistence for sporadic discharges: ‘rare’:<1/hr; ‘occasional’:1/min-1/hr; ‘frequent’:>1/min; ‘abundant’:>1/10 sec.  RPP=rhythmic and periodic patterns; GRDA=generalized rhythmic delta activity; FIRDA=frontal intermittent GRDA; LRDA=lateralized rhythmic delta activity; TIRDA=temporal intermittent rhythmic delta activity;  LPD=PLED=lateralized periodic discharges; GPD=generalized periodic discharges; BIPDs =bilateral independent periodic discharges; Mf=multifocal; SIRPDs=stimulus induced rhythmic, periodic, or ictal appearing discharges; BIRDs=brief potentially ictal rhythmic discharges >4 Hz, lasting .5-10s; PFA (paroxysmal bursts >13 Hz or =8 Hz <10s).  Modifiers: +F=with fast component; +S=with spike component; +R=with rhythmic component.  S-B=burst suppression pattern.  Max=maximal. N1-drowsy; N2-stage II sleep; N3-slow wave sleep. SSS/BETS=small sharp spikes/benign epileptiform transients of sleep. HV=hyperventilation; PS=photic stimulation]    FINDINGS:      Background:  Continuity: continuous  Symmetry: symmetric  PDR: 10 Hz activity, with amplitude to 40 uV, that attenuated to eye opening.    Reactivity: present  Voltage: normal (between 20-150uV)  Anterior Posterior Gradient: present  Other background findings: none  Breach: absent    Background Slowing:  Generalized slowing: none   Focal slowing: left frontotemporal irregular delta/theta activity was present, intermittently increased rhythmicity.    State Changes:   -Drowsiness noted with increased slowing, attenuation of fast activity, vertex transients.  -Present with N2 sleep transients with symmetric spindles and K-complexes.    Sporadic/Rhythmic Epileptiform Discharges:    Frequent left frontotemporal (max F7/T7) spike wave discharges, intermittently with few seconds 0.5-1 Hz periodic pattern.    Electrographic and Electroclinical seizures:  3 focal left posterior temporal onset seizures (limited evaluation of the clinic due to no video available) with similar electrographic evolution pattern.     d1 20:41:50		Seizure onset - Left posterior temporal   d1 20:41:51		low amplitude delta with overlying alpha  d1 20:41:57		evolution in morphology and freq-sharply contoured rhythmic theta  d1 20:42:02		increased synchrony over RT  d1 20:42:10		evolution to delta  d1 20:42:17		offset (electrographic)    d2 00:22:32		Seizure onset  d2 00:22:59		offset (electrographic)    d2 07:40:25		Seizure onset  d2 07:41:01		offset (electrographic)    Other Clinical Events:  None    Activation Procedures:   -Hyperventilation was not performed.    -Photic stimulation was not performed.     Artifacts:  Intermittent myogenic and movement artifacts were noted.    ECG:  The heart rate on single channel ECG was predominantly between 60-70 BPM.    EEG Classification / Summary:  Abnormal EEG study  3 focal left posterior temporal onset seizures as above. Limited evaluation of the clinic due to no video available.  Frequent left frontotemporal (max F7/T7) spike wave discharges, intermittently with few seconds 0.5-1 Hz periodic pattern.  Left frontotemporal irregular delta/theta activity was present, intermittently increased rhythmicity.    -----------------------------------------------------------------------------------------------------    Clinical Impression:  3 focal left posterior temporal onset seizures as above.  Increased risk for focal seizures from frontotemporal/posterior temporal region  Focal cerebral dysfunction over temporal region.  This is a preliminary report pending attending review and attestation.    Lilliam Mcmahan MD  Fellow, [facility]      -------------------------------------------------------------------------------------------------------  [facility] EEG Reading Room Ph#: [phone]  Epilepsy Answering Service after 5PM and before 8:30AM: Ph#: [phone]   RALPH BATES [id number]     Study Date: 	12-25-23 1120 - 0800 12-26-23  Duration in hours:  x 20 hr 32 min    --------------------------------------------------------------------------------------------------  History:  CC/ HPI Patient is a 48y old  Female who presents with a chief complaint of episode of unresponsiveness (26 Dec 2023 09:10)    MEDICATIONS  (STANDING):  enoxaparin Injectable 40 milliGRAM(s) SubCutaneous every 24 hours  levETIRAcetam 500 milliGRAM(s) Oral two times a day    --------------------------------------------------------------------------------------------------  Study Interpretation:    [Abbreviation Key:  PDR=alpha rhythm/posterior dominant rhythm. A-P=anterior posterior.  Amplitude: ‘very low’:<20; ‘low’:20-49; ‘medium’:; ‘high’:>150uV.  Persistence for periodic/rhythmic patterns (% of epoch) ‘rare’:<1%; ‘occasional’:1-10%; ‘frequent’:10-50%; ‘abundant’:50-90%; ‘continuous’:>90%.  Persistence for sporadic discharges: ‘rare’:<1/hr; ‘occasional’:1/min-1/hr; ‘frequent’:>1/min; ‘abundant’:>1/10 sec.  RPP=rhythmic and periodic patterns; GRDA=generalized rhythmic delta activity; FIRDA=frontal intermittent GRDA; LRDA=lateralized rhythmic delta activity; TIRDA=temporal intermittent rhythmic delta activity;  LPD=PLED=lateralized periodic discharges; GPD=generalized periodic discharges; BIPDs =bilateral independent periodic discharges; Mf=multifocal; SIRPDs=stimulus induced rhythmic, periodic, or ictal appearing discharges; BIRDs=brief potentially ictal rhythmic discharges >4 Hz, lasting .5-10s; PFA (paroxysmal bursts >13 Hz or =8 Hz <10s).  Modifiers: +F=with fast component; +S=with spike component; +R=with rhythmic component.  S-B=burst suppression pattern.  Max=maximal. N1-drowsy; N2-stage II sleep; N3-slow wave sleep. SSS/BETS=small sharp spikes/benign epileptiform transients of sleep. HV=hyperventilation; PS=photic stimulation]    FINDINGS:      Background:  Continuity: continuous  Symmetry: symmetric  PDR: 10 Hz activity, with amplitude to 40 uV, that attenuated to eye opening.    Reactivity: present  Voltage: normal (between 20-150uV)  Anterior Posterior Gradient: present  Other background findings: none  Breach: absent    Background Slowing:  Generalized slowing: none   Focal slowing: left frontotemporal irregular delta/theta activity was present, intermittently increased rhythmicity.    State Changes:   -Drowsiness noted with increased slowing, attenuation of fast activity, vertex transients.  -Present with N2 sleep transients with symmetric spindles and K-complexes.    Sporadic/Rhythmic Epileptiform Discharges:    Frequent left frontotemporal (max F7/T7) spike wave discharges, intermittently with few seconds 0.5-1 Hz periodic pattern.    Electrographic and Electroclinical seizures:  3 focal left posterior temporal onset seizures (limited evaluation of the clinic due to no video available) with similar electrographic evolution pattern.     d1 20:41:50		Seizure onset - Left posterior temporal   d1 20:41:51		low amplitude delta with overlying alpha  d1 20:41:57		evolution in morphology and freq-sharply contoured rhythmic theta  d1 20:42:02		increased synchrony over RT  d1 20:42:10		evolution to delta  d1 20:42:17		offset (electrographic)    d2 00:22:32		Seizure onset  d2 00:22:59		offset (electrographic)    d2 07:40:25		Seizure onset  d2 07:41:01		offset (electrographic)    Other Clinical Events:  None    Activation Procedures:   -Hyperventilation was not performed.    -Photic stimulation was not performed.     Artifacts:  Intermittent myogenic and movement artifacts were noted.    ECG:  The heart rate on single channel ECG was predominantly between 60-70 BPM.    EEG Classification / Summary:  Abnormal EEG study  3 focal left posterior temporal onset seizures as above. Limited evaluation of the clinic due to no video available.  Frequent left frontotemporal (max F7/T7) spike wave discharges, intermittently with few seconds 0.5-1 Hz periodic pattern.  Left frontotemporal irregular delta/theta activity was present, intermittently increased rhythmicity.    -----------------------------------------------------------------------------------------------------    Clinical Impression:  3 focal left posterior temporal onset seizures as above.  Increased risk for focal seizures from frontotemporal/posterior temporal region  Focal cerebral dysfunction over temporal region.    Lilliam Mcmahan MD  Fellow, [facility]    Julio Hobson MD  EEG/Epilepsy Attending       -------------------------------------------------------------------------------------------------------  [facility] EEG Reading Room Ph#: [phone]  Epilepsy Answering Service after 5PM and before 8:30AM: Ph#: [phone]   RALPH BATES [id number]     Study Date: 	12-25-23 1120 - 0800 12-26-23  Duration in hours:  x 20 hr 32 min    --------------------------------------------------------------------------------------------------  History:  CC/ HPI Patient is a 48y old  Female who presents with a chief complaint of episode of unresponsiveness (26 Dec 2023 09:10)    MEDICATIONS  (STANDING):  enoxaparin Injectable 40 milliGRAM(s) SubCutaneous every 24 hours  levETIRAcetam 500 milliGRAM(s) Oral two times a day    --------------------------------------------------------------------------------------------------  Study Interpretation:    [Abbreviation Key:  PDR=alpha rhythm/posterior dominant rhythm. A-P=anterior posterior.  Amplitude: ‘very low’:<20; ‘low’:20-49; ‘medium’:; ‘high’:>150uV.  Persistence for periodic/rhythmic patterns (% of epoch) ‘rare’:<1%; ‘occasional’:1-10%; ‘frequent’:10-50%; ‘abundant’:50-90%; ‘continuous’:>90%.  Persistence for sporadic discharges: ‘rare’:<1/hr; ‘occasional’:1/min-1/hr; ‘frequent’:>1/min; ‘abundant’:>1/10 sec.  RPP=rhythmic and periodic patterns; GRDA=generalized rhythmic delta activity; FIRDA=frontal intermittent GRDA; LRDA=lateralized rhythmic delta activity; TIRDA=temporal intermittent rhythmic delta activity;  LPD=PLED=lateralized periodic discharges; GPD=generalized periodic discharges; BIPDs =bilateral independent periodic discharges; Mf=multifocal; SIRPDs=stimulus induced rhythmic, periodic, or ictal appearing discharges; BIRDs=brief potentially ictal rhythmic discharges >4 Hz, lasting .5-10s; PFA (paroxysmal bursts >13 Hz or =8 Hz <10s).  Modifiers: +F=with fast component; +S=with spike component; +R=with rhythmic component.  S-B=burst suppression pattern.  Max=maximal. N1-drowsy; N2-stage II sleep; N3-slow wave sleep. SSS/BETS=small sharp spikes/benign epileptiform transients of sleep. HV=hyperventilation; PS=photic stimulation]    FINDINGS:      Background:  Continuity: continuous  Symmetry: symmetric  PDR: 10 Hz activity, with amplitude to 40 uV, that attenuated to eye opening.    Reactivity: present  Voltage: normal (between 20-150uV)  Anterior Posterior Gradient: present  Other background findings: none  Breach: absent    Background Slowing:  Generalized slowing: none   Focal slowing: left frontotemporal irregular delta/theta activity was present, intermittently increased rhythmicity.    State Changes:   -Drowsiness noted with increased slowing, attenuation of fast activity, vertex transients.  -Present with N2 sleep transients with symmetric spindles and K-complexes.    Sporadic/Rhythmic Epileptiform Discharges:    Frequent left frontotemporal (max F7/T7) spike wave discharges, intermittently with few seconds 0.5-1 Hz periodic pattern.    Electrographic and Electroclinical seizures:  3 focal left posterior temporal onset seizures (limited evaluation of the clinic due to no video available) with similar electrographic evolution pattern.     d1 20:41:50		Seizure onset - Left posterior temporal   d1 20:41:51		low amplitude delta with overlying alpha  d1 20:41:57		evolution in morphology and freq-sharply contoured rhythmic theta  d1 20:42:02		increased synchrony over RT  d1 20:42:10		evolution to delta  d1 20:42:17		offset (electrographic)    d2 00:22:32		Seizure onset  d2 00:22:59		offset (electrographic)    d2 07:40:25		Seizure onset  d2 07:41:01		offset (electrographic)    Other Clinical Events:  None    Activation Procedures:   -Hyperventilation was not performed.    -Photic stimulation was not performed.     Artifacts:  Intermittent myogenic and movement artifacts were noted.    ECG:  The heart rate on single channel ECG was predominantly between 60-70 BPM.    EEG Classification / Summary:  Abnormal EEG study  3 focal left posterior temporal onset seizures as above. Limited evaluation of the clinic due to no video available.  Frequent left frontotemporal (max F7/T7) spike wave discharges, intermittently with few seconds 0.5-1 Hz periodic pattern.  Left frontotemporal irregular delta/theta activity was present, intermittently increased rhythmicity.    -----------------------------------------------------------------------------------------------------    Clinical Impression:  3 focal left posterior temporal onset seizures as above.  Increased risk for focal seizures from frontotemporal/posterior temporal region  Focal cerebral dysfunction over temporal region.    Lilliam Mcmahan MD  Fellow, [facility]    Julio Hobson MD  EEG/Epilepsy Attending       -------------------------------------------------------------------------------------------------------  [facility] EEG Reading Room Ph#: [phone]  Epilepsy Answering Service after 5PM and before 8:30AM: Ph#: [phone]

## 2023-12-26 NOTE — PROGRESS NOTE ADULT - ASSESSMENT
48y (1975) woman with unclear PMH presenting as code stroke for decreased verbal output. 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 Her eyes then rolled back and she was foaming out of her mouth. Exam nonfocal. prelim EEG Left frontotemporal sharp waves.    Impression: Improving psychomotor slowing and amnesia following unresponsive episode of unclear etiology at this time. Rule out toxic metabolic/infectious etiologies. Rule out focal non-motor seizure. Rule out intracranial pathologies.    Recommendations   [] Start Keppra 500 mg BID   [] Continue vEEG for additional 24 hours   [] MRI brain w/w/o epilepsy protocol,  can be done as outpatient and should not hold discharge   [] Telemetry monitoring; Neurochecks/VS q4hr  [] Seizure, fall and aspiration precautions  [] Given concern for seizure, advise patient to not drive, operate heavy machinery, avoid heights, pools, bathtubs, locked doors until cleared by further follow up outpatient by neurology.   [] Upon discharge follow with Dr. Michael Nissenbaum  [address], [place] [address] [phone]    Seen with Dr. Aleman.  48y (1975) woman with unclear PMH presenting as code stroke for decreased verbal output. 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 Her eyes then rolled back and she was foaming out of her mouth. Exam nonfocal. prelim EEG Left frontotemporal sharp waves.    Impression: Improving psychomotor slowing and amnesia following unresponsive episode of unclear etiology at this time. Rule out toxic metabolic/infectious etiologies. Rule out focal non-motor seizure. Rule out intracranial pathologies.    Recommendations   [] Start Keppra 500 mg BID   [] Continue vEEG for additional 24 hours   [] MRI brain w/w/o epilepsy protocol,  can be done as outpatient and should not hold discharge   [] Telemetry monitoring; Neurochecks/VS q4hr  [] Seizure, fall and aspiration precautions  [] Given concern for seizure, advise patient to not drive, operate heavy machinery, avoid heights, pools, bathtubs, locked doors until cleared by further follow up outpatient by neurology.   [] Upon discharge follow with Dr. Michael Nissenbaum  [address], [place] [address] [phone]    Seen with Dr. Aleman.  48y (1975) woman with unclear PMH presenting as code stroke for decreased verbal output. 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 Her eyes then rolled back and she was foaming out of her mouth. Exam nonfocal. prelim EEG Left frontotemporal sharp waves.    Impression: Improving psychomotor slowing and amnesia following unresponsive episode of unclear etiology at this time. Rule out toxic metabolic/infectious etiologies. Rule out focal non-motor seizure. Rule out intracranial pathologies.    Recommendations   [] Load Keppra 1 g IVPB now  [] Start maintenance Keppra 750 mg BID   [] Continue vEEG for additional 24 hours   [] MRI brain w/w/o epilepsy protocol   [] Telemetry monitoring; Neurochecks/VS q4hr  [] Seizure, fall and aspiration precautions  [] Given concern for seizure, advise patient to not drive, operate heavy machinery, avoid heights, pools, bathtubs, locked doors until cleared by further follow up outpatient by neurology.   [] Upon discharge follow with Dr. Michael Nissenbaum  [address], [place] [address] [phone]    Seen with Dr. Aleman.  48y (1975) woman with unclear PMH presenting as code stroke for decreased verbal output. 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 Her eyes then rolled back and she was foaming out of her mouth. Exam nonfocal. prelim EEG Left frontotemporal sharp waves.    Impression: Improving psychomotor slowing and amnesia following unresponsive episode of unclear etiology at this time. Rule out toxic metabolic/infectious etiologies. Rule out focal non-motor seizure. Rule out intracranial pathologies.    Recommendations   [] Load Keppra 1 g IVPB now  [] Start maintenance Keppra 750 mg BID   [] Continue vEEG for additional 24 hours   [] MRI brain w/w/o epilepsy protocol   [] Telemetry monitoring; Neurochecks/VS q4hr  [] Seizure, fall and aspiration precautions  [] Given concern for seizure, advise patient to not drive, operate heavy machinery, avoid heights, pools, bathtubs, locked doors until cleared by further follow up outpatient by neurology.   [] Upon discharge follow with Dr. Michael Nissenbaum  [address], [place] [address] [phone]    Seen with Dr. Aleman.

## 2023-12-27 ENCOUNTER — TRANSCRIPTION ENCOUNTER (OUTPATIENT)
Age: 48
End: 2023-12-27

## 2023-12-27 VITALS
OXYGEN SATURATION: 100 % | SYSTOLIC BLOOD PRESSURE: 94 MMHG | RESPIRATION RATE: 17 BRPM | DIASTOLIC BLOOD PRESSURE: 50 MMHG | TEMPERATURE: 98 F | HEART RATE: 61 BPM

## 2023-12-27 LAB
-  AMOXICILLIN/CLAVULANIC ACID: SIGNIFICANT CHANGE UP
-  AMOXICILLIN/CLAVULANIC ACID: SIGNIFICANT CHANGE UP
-  AMPICILLIN/SULBACTAM: SIGNIFICANT CHANGE UP
-  AMPICILLIN/SULBACTAM: SIGNIFICANT CHANGE UP
-  AMPICILLIN: SIGNIFICANT CHANGE UP
-  AMPICILLIN: SIGNIFICANT CHANGE UP
-  AZTREONAM: SIGNIFICANT CHANGE UP
-  AZTREONAM: SIGNIFICANT CHANGE UP
-  CEFAZOLIN: SIGNIFICANT CHANGE UP
-  CEFAZOLIN: SIGNIFICANT CHANGE UP
-  CEFEPIME: SIGNIFICANT CHANGE UP
-  CEFEPIME: SIGNIFICANT CHANGE UP
-  CEFOXITIN: SIGNIFICANT CHANGE UP
-  CEFOXITIN: SIGNIFICANT CHANGE UP
-  CEFTRIAXONE: SIGNIFICANT CHANGE UP
-  CEFTRIAXONE: SIGNIFICANT CHANGE UP
-  CEFUROXIME: SIGNIFICANT CHANGE UP
-  CEFUROXIME: SIGNIFICANT CHANGE UP
-  CIPROFLOXACIN: SIGNIFICANT CHANGE UP
-  CIPROFLOXACIN: SIGNIFICANT CHANGE UP
-  ERTAPENEM: SIGNIFICANT CHANGE UP
-  ERTAPENEM: SIGNIFICANT CHANGE UP
-  GENTAMICIN: SIGNIFICANT CHANGE UP
-  GENTAMICIN: SIGNIFICANT CHANGE UP
-  IMIPENEM: SIGNIFICANT CHANGE UP
-  IMIPENEM: SIGNIFICANT CHANGE UP
-  LEVOFLOXACIN: SIGNIFICANT CHANGE UP
-  LEVOFLOXACIN: SIGNIFICANT CHANGE UP
-  MEROPENEM: SIGNIFICANT CHANGE UP
-  MEROPENEM: SIGNIFICANT CHANGE UP
-  NITROFURANTOIN: SIGNIFICANT CHANGE UP
-  NITROFURANTOIN: SIGNIFICANT CHANGE UP
-  PIPERACILLIN/TAZOBACTAM: SIGNIFICANT CHANGE UP
-  PIPERACILLIN/TAZOBACTAM: SIGNIFICANT CHANGE UP
-  TOBRAMYCIN: SIGNIFICANT CHANGE UP
-  TOBRAMYCIN: SIGNIFICANT CHANGE UP
-  TRIMETHOPRIM/SULFAMETHOXAZOLE: SIGNIFICANT CHANGE UP
-  TRIMETHOPRIM/SULFAMETHOXAZOLE: SIGNIFICANT CHANGE UP
ANION GAP SERPL CALC-SCNC: 11 MMOL/L — SIGNIFICANT CHANGE UP (ref 7–14)
ANION GAP SERPL CALC-SCNC: 11 MMOL/L — SIGNIFICANT CHANGE UP (ref 7–14)
BUN SERPL-MCNC: 9 MG/DL — SIGNIFICANT CHANGE UP (ref 7–23)
BUN SERPL-MCNC: 9 MG/DL — SIGNIFICANT CHANGE UP (ref 7–23)
CALCIUM SERPL-MCNC: 8.7 MG/DL — SIGNIFICANT CHANGE UP (ref 8.4–10.5)
CALCIUM SERPL-MCNC: 8.7 MG/DL — SIGNIFICANT CHANGE UP (ref 8.4–10.5)
CHLORIDE SERPL-SCNC: 106 MMOL/L — SIGNIFICANT CHANGE UP (ref 98–107)
CHLORIDE SERPL-SCNC: 106 MMOL/L — SIGNIFICANT CHANGE UP (ref 98–107)
CO2 SERPL-SCNC: 23 MMOL/L — SIGNIFICANT CHANGE UP (ref 22–31)
CO2 SERPL-SCNC: 23 MMOL/L — SIGNIFICANT CHANGE UP (ref 22–31)
CREAT SERPL-MCNC: 0.5 MG/DL — SIGNIFICANT CHANGE UP (ref 0.5–1.3)
CREAT SERPL-MCNC: 0.5 MG/DL — SIGNIFICANT CHANGE UP (ref 0.5–1.3)
CULTURE RESULTS: ABNORMAL
CULTURE RESULTS: ABNORMAL
EGFR: 115 ML/MIN/1.73M2 — SIGNIFICANT CHANGE UP
EGFR: 115 ML/MIN/1.73M2 — SIGNIFICANT CHANGE UP
GLUCOSE SERPL-MCNC: 76 MG/DL — SIGNIFICANT CHANGE UP (ref 70–99)
GLUCOSE SERPL-MCNC: 76 MG/DL — SIGNIFICANT CHANGE UP (ref 70–99)
HCT VFR BLD CALC: 32.5 % — LOW (ref 34.5–45)
HCT VFR BLD CALC: 32.5 % — LOW (ref 34.5–45)
HGB BLD-MCNC: 10.4 G/DL — LOW (ref 11.5–15.5)
HGB BLD-MCNC: 10.4 G/DL — LOW (ref 11.5–15.5)
MAGNESIUM SERPL-MCNC: 1.8 MG/DL — SIGNIFICANT CHANGE UP (ref 1.6–2.6)
MAGNESIUM SERPL-MCNC: 1.8 MG/DL — SIGNIFICANT CHANGE UP (ref 1.6–2.6)
MCHC RBC-ENTMCNC: 25.4 PG — LOW (ref 27–34)
MCHC RBC-ENTMCNC: 25.4 PG — LOW (ref 27–34)
MCHC RBC-ENTMCNC: 32 GM/DL — SIGNIFICANT CHANGE UP (ref 32–36)
MCHC RBC-ENTMCNC: 32 GM/DL — SIGNIFICANT CHANGE UP (ref 32–36)
MCV RBC AUTO: 79.3 FL — LOW (ref 80–100)
MCV RBC AUTO: 79.3 FL — LOW (ref 80–100)
METHOD TYPE: SIGNIFICANT CHANGE UP
METHOD TYPE: SIGNIFICANT CHANGE UP
NRBC # BLD: 0 /100 WBCS — SIGNIFICANT CHANGE UP (ref 0–0)
NRBC # BLD: 0 /100 WBCS — SIGNIFICANT CHANGE UP (ref 0–0)
NRBC # FLD: 0 K/UL — SIGNIFICANT CHANGE UP (ref 0–0)
NRBC # FLD: 0 K/UL — SIGNIFICANT CHANGE UP (ref 0–0)
ORGANISM # SPEC MICROSCOPIC CNT: ABNORMAL
PHOSPHATE SERPL-MCNC: 3.1 MG/DL — SIGNIFICANT CHANGE UP (ref 2.5–4.5)
PHOSPHATE SERPL-MCNC: 3.1 MG/DL — SIGNIFICANT CHANGE UP (ref 2.5–4.5)
PLATELET # BLD AUTO: 363 K/UL — SIGNIFICANT CHANGE UP (ref 150–400)
PLATELET # BLD AUTO: 363 K/UL — SIGNIFICANT CHANGE UP (ref 150–400)
POTASSIUM SERPL-MCNC: 3.7 MMOL/L — SIGNIFICANT CHANGE UP (ref 3.5–5.3)
POTASSIUM SERPL-MCNC: 3.7 MMOL/L — SIGNIFICANT CHANGE UP (ref 3.5–5.3)
POTASSIUM SERPL-SCNC: 3.7 MMOL/L — SIGNIFICANT CHANGE UP (ref 3.5–5.3)
POTASSIUM SERPL-SCNC: 3.7 MMOL/L — SIGNIFICANT CHANGE UP (ref 3.5–5.3)
RBC # BLD: 4.1 M/UL — SIGNIFICANT CHANGE UP (ref 3.8–5.2)
RBC # BLD: 4.1 M/UL — SIGNIFICANT CHANGE UP (ref 3.8–5.2)
RBC # FLD: 15.9 % — HIGH (ref 10.3–14.5)
RBC # FLD: 15.9 % — HIGH (ref 10.3–14.5)
SODIUM SERPL-SCNC: 140 MMOL/L — SIGNIFICANT CHANGE UP (ref 135–145)
SODIUM SERPL-SCNC: 140 MMOL/L — SIGNIFICANT CHANGE UP (ref 135–145)
SPECIMEN SOURCE: SIGNIFICANT CHANGE UP
SPECIMEN SOURCE: SIGNIFICANT CHANGE UP
WBC # BLD: 4.75 K/UL — SIGNIFICANT CHANGE UP (ref 3.8–10.5)
WBC # BLD: 4.75 K/UL — SIGNIFICANT CHANGE UP (ref 3.8–10.5)
WBC # FLD AUTO: 4.75 K/UL — SIGNIFICANT CHANGE UP (ref 3.8–10.5)
WBC # FLD AUTO: 4.75 K/UL — SIGNIFICANT CHANGE UP (ref 3.8–10.5)

## 2023-12-27 PROCEDURE — 99239 HOSP IP/OBS DSCHRG MGMT >30: CPT

## 2023-12-27 PROCEDURE — 99233 SBSQ HOSP IP/OBS HIGH 50: CPT

## 2023-12-27 PROCEDURE — 95718 EEG PHYS/QHP 2-12 HR W/VEEG: CPT

## 2023-12-27 RX ORDER — LEVETIRACETAM 250 MG/1
1 TABLET, FILM COATED ORAL
Qty: 60 | Refills: 0
Start: 2023-12-27 | End: 2024-01-25

## 2023-12-27 RX ADMIN — LEVETIRACETAM 750 MILLIGRAM(S): 250 TABLET, FILM COATED ORAL at 05:20

## 2023-12-27 NOTE — DISCHARGE NOTE NURSING/CASE MANAGEMENT/SOCIAL WORK - NSDCPEFALRISK_GEN_ALL_CORE
For information on Fall & Injury Prevention, visit: [email or web address] OR  [email or web address] OR  https://www.cdc.gov/steadi/patient.html For information on Fall & Injury Prevention, visit: [email or web address] OR  [email or web address] OR  https://www.cdc.gov/steadi/patient.html

## 2023-12-27 NOTE — PROGRESS NOTE ADULT - ASSESSMENT
48y (1975) woman with unclear PMH presenting as code stroke for decreased verbal output. 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 Her eyes then rolled back and she was foaming out of her mouth. Exam nonfocal.     Impression: Psychomotor slowing and amnesia following unresponsive episode due to focal non-motor seizure    Recommendations   [x] Load Keppra 1 g IVPB completed 12/26  [] Continue maintenance Keppra 750 mg BID   [] Follow final read EEG, if EEG shows no seizures then patient cleared for discharge   [] MRI brain w/w/o epilepsy protocol can be done as outpatient and should not hold discharge   [] Telemetry monitoring; Neurochecks/VS q4hr  [] Seizure, fall and aspiration precautions  [] Upon discharge follow with Dr. Michael Nissenbaum  [address], [place], [place] [id number] [phone]    Seen with Dr. Aleman attending.  48y (1975) woman with unclear PMH presenting as code stroke for decreased verbal output. 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 Her eyes then rolled back and she was foaming out of her mouth. Exam nonfocal.     Impression: Psychomotor slowing and amnesia following unresponsive episode due to focal non-motor seizure    Recommendations   [x] Load Keppra 1 g IVPB completed 12/26  [] Continue maintenance Keppra 750 mg BID   [] Follow final read EEG, if EEG shows no seizures then patient cleared for discharge   [] MRI brain w/w/o epilepsy protocol can be done as outpatient and should not hold discharge   [] Telemetry monitoring; Neurochecks/VS q4hr  [] Seizure, fall and aspiration precautions  [] Upon discharge follow with Dr. Michael Nissenbaum  [address] [facility] [address], [place], [place] [id number] [phone]    Seen with Dr. Aleman attending.  48y (1975) woman with unclear PMH presenting as code stroke for decreased verbal output. 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 Her eyes then rolled back and she was foaming out of her mouth. Exam nonfocal.     Impression: Psychomotor slowing and amnesia following unresponsive episode due to focal non-motor seizure    Recommendations   [x] Load Keppra 1 g IVPB completed 12/26  [x] EEG 12/27, no seizures  [] Continue maintenance Keppra 750 mg BID   [] No neurological contraindication for discharge   [] MRI brain w/w/o epilepsy protocol can be done as outpatient and should not hold discharge   [] Telemetry monitoring; Neurochecks/VS q4hr  [] Seizure, fall and aspiration precautions  [] Upon discharge follow with Dr. Michael Nissenbaum  [address], [place], [place] [address] [phone]    Seen with Dr. Aleman attending.  48y (1975) woman with unclear PMH presenting as code stroke for decreased verbal output. 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 Her eyes then rolled back and she was foaming out of her mouth. Exam nonfocal.     Impression: Psychomotor slowing and amnesia following unresponsive episode due to focal non-motor seizure    Recommendations   [x] Load Keppra 1 g IVPB completed 12/26  [x] EEG 12/27, no seizures  [] Continue maintenance Keppra 750 mg BID   [] No neurological contraindication for discharge   [] MRI brain w/w/o epilepsy protocol can be done as outpatient and should not hold discharge   [] Telemetry monitoring; Neurochecks/VS q4hr  [] Seizure, fall and aspiration precautions  [] Upon discharge follow with Dr. Michael Nissenbaum  [address], [place], [place] [address] [phone]    Seen with Dr. Aleman attending.

## 2023-12-27 NOTE — PROGRESS NOTE ADULT - PROBLEM SELECTOR PLAN 2
urinalysis is grossly abnormal.  currently with menstruation.   Ucx growing GNR  no symptoms of UTI.  no need for abx at this time.
urinalysis is grossly abnormal.  currently with menstruation.   Ucx growing Klebsiella.   asymptomatic, no need for treatment

## 2023-12-27 NOTE — PROGRESS NOTE ADULT - SUBJECTIVE AND OBJECTIVE BOX
Neurology - Progress Note    -  Spectra: [phone] ([facility]), [phone] ([facility])  -    RALPH BATES is a 48y (1975) woman with unclear PMH presenting as code stroke for decreased verbal output. 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 Her eyes then rolled back and she was foaming out of her mouth.    Interval History: No acute events overnight.       Review of Systems:   All other review of systems is negative unless indicated above.    Allergies:  No Known Allergies      PMHx/PSHx/Family Hx: As above, otherwise see below   No pertinent past medical history    History of abnormal Pap smear        Social Hx:  No current use of tobacco, alcohol, or illicit drugs      Medications:  MEDICATIONS  (STANDING):  enoxaparin Injectable 40 milliGRAM(s) SubCutaneous every 24 hours  levETIRAcetam 750 milliGRAM(s) Oral two times a day    MEDICATIONS  (PRN):  acetaminophen     Tablet .. 650 milliGRAM(s) Oral every 6 hours PRN Temp greater or equal to 38C (100.4F), Mild Pain (1 - 3)  aluminum hydroxide/magnesium hydroxide/simethicone Suspension 30 milliLiter(s) Oral every 4 hours PRN Dyspepsia  melatonin 3 milliGRAM(s) Oral at bedtime PRN Insomnia  ondansetron Injectable 4 milliGRAM(s) IV Push every 8 hours PRN Nausea and/or Vomiting      Vitals:  T(C): 36.7 (12-27-23 @ 05:24), Max: 36.9 (12-26-23 @ 11:12)  HR: 61 (12-27-23 @ 05:24) (61 - 105)  BP: 92/60 (12-27-23 @ 05:24) (92/60 - 124/38)  RR: 17 (12-27-23 @ 05:24) (17 - 17)  SpO2: 100% (12-27-23 @ 05:24) (98% - 100%)    Physical Examination:   General - NAD      Neurologic Exam:  Mental status - Awake, Alert. Speech clear, fluent. Follows commands    Cranial nerves - EOMI, facial strength intact without asymmetry b/l, hearing intact b/l    Motor -   Strength testing  Moving all extremities antigravity and symmetrically         Labs:                        10.4   4.75  )-----------( 363      ( 27 Dec 2023 05:00 )             32.5     12-27    140  |  106  |  9   ----------------------------<  76  3.7   |  23  |  0.50    Ca    8.7      27 Dec 2023 05:00  Phos  3.1     12-27  Mg     1.80     12-27     Neurology - Progress Note    -  Spectra: [phone] ([facility]), [phone] ([facility])  -    RALPH BATES is a 48y (1975) woman with unclear PMH presenting as code stroke for decreased verbal output. 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 Her eyes then rolled back and she was foaming out of her mouth.    Interval History: No acute events overnight.       Review of Systems:   All other review of systems is negative unless indicated above.    Allergies:  No Known Allergies      PMHx/PSHx/Family Hx: As above, otherwise see below   No pertinent past medical history    History of abnormal Pap smear        Social Hx:  No current use of tobacco, alcohol, or illicit drugs      Medications:  MEDICATIONS  (STANDING):  enoxaparin Injectable 40 milliGRAM(s) SubCutaneous every 24 hours  levETIRAcetam 750 milliGRAM(s) Oral two times a day    MEDICATIONS  (PRN):  acetaminophen     Tablet .. 650 milliGRAM(s) Oral every 6 hours PRN Temp greater or equal to 38C (100.4F), Mild Pain (1 - 3)  aluminum hydroxide/magnesium hydroxide/simethicone Suspension 30 milliLiter(s) Oral every 4 hours PRN Dyspepsia  melatonin 3 milliGRAM(s) Oral at bedtime PRN Insomnia  ondansetron Injectable 4 milliGRAM(s) IV Push every 8 hours PRN Nausea and/or Vomiting      Vitals:  T(C): 36.7 (12-27-23 @ 05:24), Max: 36.9 (12-26-23 @ 11:12)  HR: 61 (12-27-23 @ 05:24) (61 - 105)  BP: 92/60 (12-27-23 @ 05:24) (92/60 - 124/38)  RR: 17 (12-27-23 @ 05:24) (17 - 17)  SpO2: 100% (12-27-23 @ 05:24) (98% - 100%)    Physical Examination:   General - NAD      Neurologic Exam:  Mental status - Awake, Alert. Speech clear, fluent. Follows commands    Cranial nerves - EOMI, facial strength intact without asymmetry b/l, hearing intact b/l    Motor -   Strength testing  Moving all extremities antigravity and symmetrically         Labs:                        10.4   4.75  )-----------( 363      ( 27 Dec 2023 05:00 )             32.5     12-27    140  |  106  |  9   ----------------------------<  76  3.7   |  23  |  0.50    Ca    8.7      27 Dec 2023 05:00  Phos  3.1     12-27  Mg     1.80     12-27     Neurology - Progress Note    -  Spectra: [phone] ([facility]), [phone] ([facility])  -    RALPH BATES is a 48y (1975) woman with unclear PMH presenting as code stroke for decreased verbal output. 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 Her eyes then rolled back and she was foaming out of her mouth.    Interval History: No acute events overnight.       Review of Systems:   All other review of systems is negative unless indicated above.    Allergies:  No Known Allergies      PMHx/PSHx/Family Hx: As above, otherwise see below   No pertinent past medical history    History of abnormal Pap smear        Social Hx:  No current use of tobacco, alcohol, or illicit drugs      Medications:  MEDICATIONS  (STANDING):  enoxaparin Injectable 40 milliGRAM(s) SubCutaneous every 24 hours  levETIRAcetam 750 milliGRAM(s) Oral two times a day    MEDICATIONS  (PRN):  acetaminophen     Tablet .. 650 milliGRAM(s) Oral every 6 hours PRN Temp greater or equal to 38C (100.4F), Mild Pain (1 - 3)  aluminum hydroxide/magnesium hydroxide/simethicone Suspension 30 milliLiter(s) Oral every 4 hours PRN Dyspepsia  melatonin 3 milliGRAM(s) Oral at bedtime PRN Insomnia  ondansetron Injectable 4 milliGRAM(s) IV Push every 8 hours PRN Nausea and/or Vomiting      Vitals:  T(C): 36.7 (12-27-23 @ 05:24), Max: 36.9 (12-26-23 @ 11:12)  HR: 61 (12-27-23 @ 05:24) (61 - 105)  BP: 92/60 (12-27-23 @ 05:24) (92/60 - 124/38)  RR: 17 (12-27-23 @ 05:24) (17 - 17)  SpO2: 100% (12-27-23 @ 05:24) (98% - 100%)    Physical Examination:   General - NAD      Neurologic Exam:  Mental status - Awake, Alert. Speech clear, fluent. Follows commands    Cranial nerves - EOMI, facial strength intact without asymmetry b/l, hearing intact b/l    Motor -   Strength testing  Moving all extremities antigravity and symmetrically         Labs:                        10.4   4.75  )-----------( 363      ( 27 Dec 2023 05:00 )             32.5     12-27    140  |  106  |  9   ----------------------------<  76  3.7   |  23  |  0.50    Ca    8.7      27 Dec 2023 05:00  Phos  3.1     12-27  Mg     1.80     12-27 12/27	  Clinical Impression:  Increased risk for focal seizures left from frontotemporal region.  Focal cerebral dysfunction over left  temporal region.  Mild diffuse cerebral dysfunction, nonspecific.  No seizures were observed during the current recording.  Frequent left frontotemporal (max F7/T7) spike wave discharges, intermittently with few seconds 0.5-1 Hz periodic pattern.   Neurology - Progress Note    -  Spectra: [phone] ([facility]), [phone] ([facility])  -    RALPH BATES is a 48y (1975) woman with unclear PMH presenting as code stroke for decreased verbal output. 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 Her eyes then rolled back and she was foaming out of her mouth.    Interval History: No acute events overnight.       Review of Systems:   All other review of systems is negative unless indicated above.    Allergies:  No Known Allergies      PMHx/PSHx/Family Hx: As above, otherwise see below   No pertinent past medical history    History of abnormal Pap smear        Social Hx:  No current use of tobacco, alcohol, or illicit drugs      Medications:  MEDICATIONS  (STANDING):  enoxaparin Injectable 40 milliGRAM(s) SubCutaneous every 24 hours  levETIRAcetam 750 milliGRAM(s) Oral two times a day    MEDICATIONS  (PRN):  acetaminophen     Tablet .. 650 milliGRAM(s) Oral every 6 hours PRN Temp greater or equal to 38C (100.4F), Mild Pain (1 - 3)  aluminum hydroxide/magnesium hydroxide/simethicone Suspension 30 milliLiter(s) Oral every 4 hours PRN Dyspepsia  melatonin 3 milliGRAM(s) Oral at bedtime PRN Insomnia  ondansetron Injectable 4 milliGRAM(s) IV Push every 8 hours PRN Nausea and/or Vomiting      Vitals:  T(C): 36.7 (12-27-23 @ 05:24), Max: 36.9 (12-26-23 @ 11:12)  HR: 61 (12-27-23 @ 05:24) (61 - 105)  BP: 92/60 (12-27-23 @ 05:24) (92/60 - 124/38)  RR: 17 (12-27-23 @ 05:24) (17 - 17)  SpO2: 100% (12-27-23 @ 05:24) (98% - 100%)    Physical Examination:   General - NAD      Neurologic Exam:  Mental status - Awake, Alert. Speech clear, fluent. Follows commands    Cranial nerves - EOMI, facial strength intact without asymmetry b/l, hearing intact b/l    Motor -   Strength testing  Moving all extremities antigravity and symmetrically         Labs:                        10.4   4.75  )-----------( 363      ( 27 Dec 2023 05:00 )             32.5     12-27    140  |  106  |  9   ----------------------------<  76  3.7   |  23  |  0.50    Ca    8.7      27 Dec 2023 05:00  Phos  3.1     12-27  Mg     1.80     12-27 12/27	  Clinical Impression:  Increased risk for focal seizures left from frontotemporal region.  Focal cerebral dysfunction over left  temporal region.  Mild diffuse cerebral dysfunction, nonspecific.  No seizures were observed during the current recording.  Frequent left frontotemporal (max F7/T7) spike wave discharges, intermittently with few seconds 0.5-1 Hz periodic pattern.

## 2023-12-27 NOTE — PHARMACOTHERAPY INTERVENTION NOTE - COMMENTS
Discharge medications reviewed with the patient. Current medication schedule was discussed in detail including: medication name, indication, dose, administration times, treatment duration, side effects, and special instructions. Questions and concerns were answered and addressed. Patient demonstrated understanding. Patient provided with educational handout.    New medications: Vicki Rain, Pharm[facility], [facility]  Clinical Pharmacy Specialist  [id number]  Discharge medications reviewed with the patient. Current medication schedule was discussed in detail including: medication name, indication, dose, administration times, treatment duration, side effects, and special instructions. Questions and concerns were answered and addressed. Patient demonstrated understanding. Patient provided with educational handout.    New medications: [facility] Rain[facility] PharmD, [facility]  Clinical Pharmacy Specialist  [id number]

## 2023-12-27 NOTE — PROGRESS NOTE ADULT - SUBJECTIVE AND OBJECTIVE BOX
[facility] Division of Hospital Medicine  Alexandre Ricks MD  In House Pager [phone]    Patient is a 48y old  Female who presents with a chief complaint of episode of unresponsiveness (27 Dec 2023 10:44)    SUBJECTIVE / OVERNIGHT EVENTS: no acute events. no complaints. vEEG on going.     ROS: Denied Fever, Chill, CP, SOB, Abd pain, N/V/D, LE swelling or pain.     MEDICATIONS  (STANDING):  enoxaparin Injectable 40 milliGRAM(s) SubCutaneous every 24 hours  levETIRAcetam 750 milliGRAM(s) Oral two times a day    MEDICATIONS  (PRN):  acetaminophen     Tablet .. 650 milliGRAM(s) Oral every 6 hours PRN Temp greater or equal to 38C (100.4F), Mild Pain (1 - 3)  aluminum hydroxide/magnesium hydroxide/simethicone Suspension 30 milliLiter(s) Oral every 4 hours PRN Dyspepsia  melatonin 3 milliGRAM(s) Oral at bedtime PRN Insomnia  ondansetron Injectable 4 milliGRAM(s) IV Push every 8 hours PRN Nausea and/or Vomiting    CAPILLARY BLOOD GLUCOSE        I&O's Summary      Vital Signs Last 24 Hrs  T(C): 36.7 (27 Dec 2023 10:40), Max: 36.8 (26 Dec 2023 19:20)  T(F): 98.1 (27 Dec 2023 10:40), Max: 98.2 (26 Dec 2023 19:20)  HR: 61 (27 Dec 2023 10:40) (61 - 68)  BP: 94/50 (27 Dec 2023 10:40) (92/60 - 108/63)  BP(mean): --  RR: 17 (27 Dec 2023 10:40) (17 - 17)  SpO2: 100% (27 Dec 2023 10:40) (100% - 100%)    Parameters below as of 27 Dec 2023 10:40  Patient On (Oxygen Delivery Method): room air    Physical Exam:  GENERAL: no apparent distress; on RA; calm  CHEST/LUNG: Clear to auscultation bilaterally; No wheezing; No crackles  HEART: no obvious audible murmurs; ext warm to touch; No edema  ABDOMEN: Soft, Nontender, Nondistended; Bowel sounds present  MSK: no joint or back on palpation; no joint erythema or swelling.   NEUROLOGY: Awake and alert; CN 2-12 grossly intact, no obvious FND     LABS:                        10.4   4.75  )-----------( 363      ( 27 Dec 2023 05:00 )             32.5     12-27    140  |  106  |  9   ----------------------------<  76  3.7   |  23  |  0.50    Ca    8.7      27 Dec 2023 05:00  Phos  3.1     12-27  Mg     1.80     12-27            Urinalysis Basic - ( 27 Dec 2023 05:00 )    Color: x / Appearance: x / SG: x / pH: x  Gluc: 76 mg/dL / Ketone: x  / Bili: x / Urobili: x   Blood: x / Protein: x / Nitrite: x   Leuk Esterase: x / RBC: x / WBC x   Sq Epi: x / Non Sq Epi: x / Bacteria: x        RADIOLOGY & ADDITIONAL TESTS:  Results Reviewed: Y  Imaging Personally Reviewed: Y  Electrocardiogram Personally Reviewed: Y    COORDINATION OF CARE:  Care Discussed with Consultants/Other Providers [Y/N]: Y  Prior or Outpatient Records Reviewed [Y/N]: Y   [facility] Division of Hospital Medicine  Alexandre Ricks MD  In House Pager [phone]    Patient is a 48y old  Female who presents with a chief complaint of episode of unresponsiveness (27 Dec 2023 10:44)    SUBJECTIVE / OVERNIGHT EVENTS: no acute events. no complaints. vEEG on going.     ROS: Denied Fever, Chill, CP, SOB, Abd pain, N/V/D, LE swelling or pain.     MEDICATIONS  (STANDING):  enoxaparin Injectable 40 milliGRAM(s) SubCutaneous every 24 hours  levETIRAcetam 750 milliGRAM(s) Oral two times a day    MEDICATIONS  (PRN):  acetaminophen     Tablet .. 650 milliGRAM(s) Oral every 6 hours PRN Temp greater or equal to 38C (100.4F), Mild Pain (1 - 3)  aluminum hydroxide/magnesium hydroxide/simethicone Suspension 30 milliLiter(s) Oral every 4 hours PRN Dyspepsia  melatonin 3 milliGRAM(s) Oral at bedtime PRN Insomnia  ondansetron Injectable 4 milliGRAM(s) IV Push every 8 hours PRN Nausea and/or Vomiting    CAPILLARY BLOOD GLUCOSE        I&O's Summary      Vital Signs Last 24 Hrs  T(C): 36.7 (27 Dec 2023 10:40), Max: 36.8 (26 Dec 2023 19:20)  T(F): 98.1 (27 Dec 2023 10:40), Max: 98.2 (26 Dec 2023 19:20)  HR: 61 (27 Dec 2023 10:40) (61 - 68)  BP: 94/50 (27 Dec 2023 10:40) (92/60 - 108/63)  BP(mean): --  RR: 17 (27 Dec 2023 10:40) (17 - 17)  SpO2: 100% (27 Dec 2023 10:40) (100% - 100%)    Parameters below as of 27 Dec 2023 10:40  Patient On (Oxygen Delivery Method): room air    Physical Exam:  GENERAL: no apparent distress; on RA; calm  CHEST/LUNG: Clear to auscultation bilaterally; No wheezing; No crackles  HEART: no obvious audible murmurs; ext warm to touch; No edema  ABDOMEN: Soft, Nontender, Nondistended; Bowel sounds present  MSK: no joint or back on palpation; no joint erythema or swelling.   NEUROLOGY: Awake and alert; CN 2-12 grossly intact, no obvious FND     LABS:                        10.4   4.75  )-----------( 363      ( 27 Dec 2023 05:00 )             32.5     12-27    140  |  106  |  9   ----------------------------<  76  3.7   |  23  |  0.50    Ca    8.7      27 Dec 2023 05:00  Phos  3.1     12-27  Mg     1.80     12-27            Urinalysis Basic - ( 27 Dec 2023 05:00 )    Color: x / Appearance: x / SG: x / pH: x  Gluc: 76 mg/dL / Ketone: x  / Bili: x / Urobili: x   Blood: x / Protein: x / Nitrite: x   Leuk Esterase: x / RBC: x / WBC x   Sq Epi: x / Non Sq Epi: x / Bacteria: x        RADIOLOGY & ADDITIONAL TESTS:  Results Reviewed: Y  Imaging Personally Reviewed: Y  Electrocardiogram Personally Reviewed: Y    COORDINATION OF CARE:  Care Discussed with Consultants/Other Providers [Y/N]: Y  Prior or Outpatient Records Reviewed [Y/N]: Y

## 2023-12-27 NOTE — EEG REPORT - NS EEG TEXT BOX
RALPH BATES [id number]     Study Date: 	12-25-23 0800 - 0800 12-26-23  Duration in hours:  x 24 hr     --------------------------------------------------------------------------------------------------  History:  CC/ HPI Patient is a 48y old  Female who presents with a chief complaint of episode of unresponsiveness (26 Dec 2023 09:10)    MEDICATIONS  (STANDING):  enoxaparin Injectable 40 milliGRAM(s) SubCutaneous every 24 hours  levETIRAcetam 500 milliGRAM(s) Oral two times a day    --------------------------------------------------------------------------------------------------  Study Interpretation:    [Abbreviation Key:  PDR=alpha rhythm/posterior dominant rhythm. A-P=anterior posterior.  Amplitude: ‘very low’:<20; ‘low’:20-49; ‘medium’:; ‘high’:>150uV.  Persistence for periodic/rhythmic patterns (% of epoch) ‘rare’:<1%; ‘occasional’:1-10%; ‘frequent’:10-50%; ‘abundant’:50-90%; ‘continuous’:>90%.  Persistence for sporadic discharges: ‘rare’:<1/hr; ‘occasional’:1/min-1/hr; ‘frequent’:>1/min; ‘abundant’:>1/10 sec.  RPP=rhythmic and periodic patterns; GRDA=generalized rhythmic delta activity; FIRDA=frontal intermittent GRDA; LRDA=lateralized rhythmic delta activity; TIRDA=temporal intermittent rhythmic delta activity;  LPD=PLED=lateralized periodic discharges; GPD=generalized periodic discharges; BIPDs =bilateral independent periodic discharges; Mf=multifocal; SIRPDs=stimulus induced rhythmic, periodic, or ictal appearing discharges; BIRDs=brief potentially ictal rhythmic discharges >4 Hz, lasting .5-10s; PFA (paroxysmal bursts >13 Hz or =8 Hz <10s).  Modifiers: +F=with fast component; +S=with spike component; +R=with rhythmic component.  S-B=burst suppression pattern.  Max=maximal. N1-drowsy; N2-stage II sleep; N3-slow wave sleep. SSS/BETS=small sharp spikes/benign epileptiform transients of sleep. HV=hyperventilation; PS=photic stimulation]    FINDINGS:      Background:  Continuity: continuous  Symmetry: symmetric  PDR: 9-10 Hz activity, with amplitude to 40 uV, that attenuated to eye opening.    Reactivity: present  Voltage: normal (between 20-150uV)  Anterior Posterior Gradient: present  Other background findings: none  Breach: absent    Background Slowing:  Generalized slowing: intermittent delta/theta activity at times with semirhythmic appearance  Focal slowing: left frontotemporal irregular delta/theta activity was present.    State Changes:   -Drowsiness noted with increased slowing, attenuation of fast activity, vertex transients.  -Present with N2 sleep transients with symmetric spindles and K-complexes.    Sporadic/Rhythmic Epileptiform Discharges:    Frequent left frontotemporal (max F7/T7) spike wave discharges, intermittently with few seconds 0.5-1 Hz periodic pattern.    Electrographic and Electroclinical seizures:  No seizures during the current recording.    Other Clinical Events:  None    Activation Procedures:   -Hyperventilation was not performed.    -Photic stimulation was not performed.     Artifacts:  Intermittent myogenic and movement artifacts were noted.    ECG:  The heart rate on single channel ECG was predominantly between 60-70 BPM.    EEG Classification / Summary:  Abnormal EEG study  No seizures were observed during the current recording.  Frequent left frontotemporal (max F7/T7) spike wave discharges, intermittently with few seconds 0.5-1 Hz periodic pattern.  Left frontotemporal irregular delta/theta activity was present.  Intermittent generalized slowing, at times with semirhythmic appearance.  -----------------------------------------------------------------------------------------------------    Clinical Impression:  Increased risk for focal seizures left from frontotemporal region.  Focal cerebral dysfunction over left  temporal region.  Mild diffuse cerebral dysfunction, nonspecific.  This is a preliminary report pending attending review and attestation.    Lilliam Mcmahna MD  Fellow, [facility]      -------------------------------------------------------------------------------------------------------  [facility] EEG Reading Room Ph#: [phone]  Epilepsy Answering Service after 5PM and before 8:30AM: Ph#: [phone]   RALPH BATES [id number]     Study Date: 	12-25-23 0800 - 0800 12-26-23  Duration in hours:  x 24 hr     --------------------------------------------------------------------------------------------------  History:  CC/ HPI Patient is a 48y old  Female who presents with a chief complaint of episode of unresponsiveness (26 Dec 2023 09:10)    MEDICATIONS  (STANDING):  enoxaparin Injectable 40 milliGRAM(s) SubCutaneous every 24 hours  levETIRAcetam 500 milliGRAM(s) Oral two times a day    --------------------------------------------------------------------------------------------------  Study Interpretation:    [Abbreviation Key:  PDR=alpha rhythm/posterior dominant rhythm. A-P=anterior posterior.  Amplitude: ‘very low’:<20; ‘low’:20-49; ‘medium’:; ‘high’:>150uV.  Persistence for periodic/rhythmic patterns (% of epoch) ‘rare’:<1%; ‘occasional’:1-10%; ‘frequent’:10-50%; ‘abundant’:50-90%; ‘continuous’:>90%.  Persistence for sporadic discharges: ‘rare’:<1/hr; ‘occasional’:1/min-1/hr; ‘frequent’:>1/min; ‘abundant’:>1/10 sec.  RPP=rhythmic and periodic patterns; GRDA=generalized rhythmic delta activity; FIRDA=frontal intermittent GRDA; LRDA=lateralized rhythmic delta activity; TIRDA=temporal intermittent rhythmic delta activity;  LPD=PLED=lateralized periodic discharges; GPD=generalized periodic discharges; BIPDs =bilateral independent periodic discharges; Mf=multifocal; SIRPDs=stimulus induced rhythmic, periodic, or ictal appearing discharges; BIRDs=brief potentially ictal rhythmic discharges >4 Hz, lasting .5-10s; PFA (paroxysmal bursts >13 Hz or =8 Hz <10s).  Modifiers: +F=with fast component; +S=with spike component; +R=with rhythmic component.  S-B=burst suppression pattern.  Max=maximal. N1-drowsy; N2-stage II sleep; N3-slow wave sleep. SSS/BETS=small sharp spikes/benign epileptiform transients of sleep. HV=hyperventilation; PS=photic stimulation]    FINDINGS:      Background:  Continuity: continuous  Symmetry: symmetric  PDR: 9-10 Hz activity, with amplitude to 40 uV, that attenuated to eye opening.    Reactivity: present  Voltage: normal (between 20-150uV)  Anterior Posterior Gradient: present  Other background findings: none  Breach: absent    Background Slowing:  Generalized slowing: intermittent delta/theta activity at times with semirhythmic appearance  Focal slowing: left frontotemporal irregular delta/theta activity was present.    State Changes:   -Drowsiness noted with increased slowing, attenuation of fast activity, vertex transients.  -Present with N2 sleep transients with symmetric spindles and K-complexes.    Sporadic/Rhythmic Epileptiform Discharges:    Frequent left frontotemporal (max F7/T7) spike wave discharges, intermittently with few seconds 0.5-1 Hz periodic pattern.    Electrographic and Electroclinical seizures:  No seizures during the current recording.    Other Clinical Events:  None    Activation Procedures:   -Hyperventilation was not performed.    -Photic stimulation was not performed.     Artifacts:  Intermittent myogenic and movement artifacts were noted.    ECG:  The heart rate on single channel ECG was predominantly between 60-70 BPM.    EEG Classification / Summary:  Abnormal EEG study  No seizures were observed during the current recording.  Frequent left frontotemporal (max F7/T7) spike wave discharges, intermittently with few seconds 0.5-1 Hz periodic pattern.  Left frontotemporal irregular delta/theta activity was present.  Intermittent generalized slowing, at times with semirhythmic appearance.  -----------------------------------------------------------------------------------------------------    Clinical Impression:  Increased risk for focal seizures left from frontotemporal region.  Focal cerebral dysfunction over left  temporal region.  Mild diffuse cerebral dysfunction, nonspecific.  This is a preliminary report pending attending review and attestation.    Lilliam Mcmahan MD  Fellow, [facility]      -------------------------------------------------------------------------------------------------------  [facility] EEG Reading Room Ph#: [phone]  Epilepsy Answering Service after 5PM and before 8:30AM: Ph#: [phone]   RALPH BATES [id number]     Study Date: 	12-25-23 0800 - 0800 12-26-23  Duration in hours:  x 24 hr     --------------------------------------------------------------------------------------------------  History:  CC/ HPI Patient is a 48y old  Female who presents with a chief complaint of episode of unresponsiveness (26 Dec 2023 09:10)    MEDICATIONS  (STANDING):  enoxaparin Injectable 40 milliGRAM(s) SubCutaneous every 24 hours  levETIRAcetam 500 milliGRAM(s) Oral two times a day    --------------------------------------------------------------------------------------------------  Study Interpretation:    [Abbreviation Key:  PDR=alpha rhythm/posterior dominant rhythm. A-P=anterior posterior.  Amplitude: ‘very low’:<20; ‘low’:20-49; ‘medium’:; ‘high’:>150uV.  Persistence for periodic/rhythmic patterns (% of epoch) ‘rare’:<1%; ‘occasional’:1-10%; ‘frequent’:10-50%; ‘abundant’:50-90%; ‘continuous’:>90%.  Persistence for sporadic discharges: ‘rare’:<1/hr; ‘occasional’:1/min-1/hr; ‘frequent’:>1/min; ‘abundant’:>1/10 sec.  RPP=rhythmic and periodic patterns; GRDA=generalized rhythmic delta activity; FIRDA=frontal intermittent GRDA; LRDA=lateralized rhythmic delta activity; TIRDA=temporal intermittent rhythmic delta activity;  LPD=PLED=lateralized periodic discharges; GPD=generalized periodic discharges; BIPDs =bilateral independent periodic discharges; Mf=multifocal; SIRPDs=stimulus induced rhythmic, periodic, or ictal appearing discharges; BIRDs=brief potentially ictal rhythmic discharges >4 Hz, lasting .5-10s; PFA (paroxysmal bursts >13 Hz or =8 Hz <10s).  Modifiers: +F=with fast component; +S=with spike component; +R=with rhythmic component.  S-B=burst suppression pattern.  Max=maximal. N1-drowsy; N2-stage II sleep; N3-slow wave sleep. SSS/BETS=small sharp spikes/benign epileptiform transients of sleep. HV=hyperventilation; PS=photic stimulation]    FINDINGS:      Background:  Continuity: continuous  Symmetry: symmetric  PDR: 9-10 Hz activity, with amplitude to 40 uV, that attenuated to eye opening.    Reactivity: present  Voltage: normal (between 20-150uV)  Anterior Posterior Gradient: present  Other background findings: none  Breach: absent    Background Slowing:  Generalized slowing: intermittent delta/theta activity at times with semirhythmic appearance  Focal slowing: left frontotemporal irregular delta/theta activity was present.    State Changes:   -Drowsiness noted with increased slowing, attenuation of fast activity, vertex transients.  -Present with N2 sleep transients with symmetric spindles and K-complexes.    Sporadic/Rhythmic Epileptiform Discharges:    Frequent left frontotemporal (max F7/T7) spike wave discharges, intermittently with few seconds 0.5-1 Hz periodic pattern.    Electrographic and Electroclinical seizures:  No seizures during the current recording.    Other Clinical Events:  None    Activation Procedures:   -Hyperventilation was not performed.    -Photic stimulation was not performed.     Artifacts:  Intermittent myogenic and movement artifacts were noted.    ECG:  The heart rate on single channel ECG was predominantly between 60-70 BPM.    EEG Classification / Summary:  Abnormal EEG study  No seizures were observed during the current recording.  Frequent left frontotemporal (max F7/T7) spike wave discharges, intermittently with few seconds 0.5-1 Hz periodic pattern.  Left frontotemporal irregular delta/theta activity was present.  Intermittent generalized slowing, at times with semirhythmic appearance.  -----------------------------------------------------------------------------------------------------    Clinical Impression:  Increased risk for focal seizures left from frontotemporal region.  Focal cerebral dysfunction over left  temporal region.  Mild diffuse cerebral dysfunction, nonspecific.    Lilliam Mcmahan MD  Fellow, [facility]    Julio Hobson MD  EEG/Epilepsy Attending       -------------------------------------------------------------------------------------------------------  [facility] EEG Reading Room Ph#: [phone]  Epilepsy Answering Service after 5PM and before 8:30AM: Ph#: [phone]   RALPH BATES [id number]     Study Date: 	12-25-23 0800 - 0800 12-26-23  Duration in hours:  x 24 hr     --------------------------------------------------------------------------------------------------  History:  CC/ HPI Patient is a 48y old  Female who presents with a chief complaint of episode of unresponsiveness (26 Dec 2023 09:10)    MEDICATIONS  (STANDING):  enoxaparin Injectable 40 milliGRAM(s) SubCutaneous every 24 hours  levETIRAcetam 500 milliGRAM(s) Oral two times a day    --------------------------------------------------------------------------------------------------  Study Interpretation:    [Abbreviation Key:  PDR=alpha rhythm/posterior dominant rhythm. A-P=anterior posterior.  Amplitude: ‘very low’:<20; ‘low’:20-49; ‘medium’:; ‘high’:>150uV.  Persistence for periodic/rhythmic patterns (% of epoch) ‘rare’:<1%; ‘occasional’:1-10%; ‘frequent’:10-50%; ‘abundant’:50-90%; ‘continuous’:>90%.  Persistence for sporadic discharges: ‘rare’:<1/hr; ‘occasional’:1/min-1/hr; ‘frequent’:>1/min; ‘abundant’:>1/10 sec.  RPP=rhythmic and periodic patterns; GRDA=generalized rhythmic delta activity; FIRDA=frontal intermittent GRDA; LRDA=lateralized rhythmic delta activity; TIRDA=temporal intermittent rhythmic delta activity;  LPD=PLED=lateralized periodic discharges; GPD=generalized periodic discharges; BIPDs =bilateral independent periodic discharges; Mf=multifocal; SIRPDs=stimulus induced rhythmic, periodic, or ictal appearing discharges; BIRDs=brief potentially ictal rhythmic discharges >4 Hz, lasting .5-10s; PFA (paroxysmal bursts >13 Hz or =8 Hz <10s).  Modifiers: +F=with fast component; +S=with spike component; +R=with rhythmic component.  S-B=burst suppression pattern.  Max=maximal. N1-drowsy; N2-stage II sleep; N3-slow wave sleep. SSS/BETS=small sharp spikes/benign epileptiform transients of sleep. HV=hyperventilation; PS=photic stimulation]    FINDINGS:      Background:  Continuity: continuous  Symmetry: symmetric  PDR: 9-10 Hz activity, with amplitude to 40 uV, that attenuated to eye opening.    Reactivity: present  Voltage: normal (between 20-150uV)  Anterior Posterior Gradient: present  Other background findings: none  Breach: absent    Background Slowing:  Generalized slowing: intermittent delta/theta activity at times with semirhythmic appearance  Focal slowing: left frontotemporal irregular delta/theta activity was present.    State Changes:   -Drowsiness noted with increased slowing, attenuation of fast activity, vertex transients.  -Present with N2 sleep transients with symmetric spindles and K-complexes.    Sporadic/Rhythmic Epileptiform Discharges:    Frequent left frontotemporal (max F7/T7) spike wave discharges, intermittently with few seconds 0.5-1 Hz periodic pattern.    Electrographic and Electroclinical seizures:  No seizures during the current recording.    Other Clinical Events:  None    Activation Procedures:   -Hyperventilation was not performed.    -Photic stimulation was not performed.     Artifacts:  Intermittent myogenic and movement artifacts were noted.    ECG:  The heart rate on single channel ECG was predominantly between 60-70 BPM.    EEG Classification / Summary:  Abnormal EEG study  No seizures were observed during the current recording.  Frequent left frontotemporal (max F7/T7) spike wave discharges, intermittently with few seconds 0.5-1 Hz periodic pattern.  Left frontotemporal irregular delta/theta activity was present.  Intermittent generalized slowing, at times with semirhythmic appearance.  -----------------------------------------------------------------------------------------------------    Clinical Impression:  Increased risk for focal seizures left from frontotemporal region.  Focal cerebral dysfunction over left  temporal region.  Mild diffuse cerebral dysfunction, nonspecific.    Lilliam Mcmahan MD  Fellow, [facility]    Julio Hobson MD  EEG/Epilepsy Attending       -------------------------------------------------------------------------------------------------------  [facility] EEG Reading Room Ph#: [phone]  Epilepsy Answering Service after 5PM and before 8:30AM: Ph#: [phone]   RALPH BATES [id number]     Study Date: 	12-25-23 0800 - 1200  12-26-23  Duration in hours:  x 28 hr     --------------------------------------------------------------------------------------------------  History:  CC/ HPI Patient is a 48y old  Female who presents with a chief complaint of episode of unresponsiveness (26 Dec 2023 09:10)    MEDICATIONS  (STANDING):  enoxaparin Injectable 40 milliGRAM(s) SubCutaneous every 24 hours  levETIRAcetam 500 milliGRAM(s) Oral two times a day    --------------------------------------------------------------------------------------------------  Study Interpretation:    [Abbreviation Key:  PDR=alpha rhythm/posterior dominant rhythm. A-P=anterior posterior.  Amplitude: ‘very low’:<20; ‘low’:20-49; ‘medium’:; ‘high’:>150uV.  Persistence for periodic/rhythmic patterns (% of epoch) ‘rare’:<1%; ‘occasional’:1-10%; ‘frequent’:10-50%; ‘abundant’:50-90%; ‘continuous’:>90%.  Persistence for sporadic discharges: ‘rare’:<1/hr; ‘occasional’:1/min-1/hr; ‘frequent’:>1/min; ‘abundant’:>1/10 sec.  RPP=rhythmic and periodic patterns; GRDA=generalized rhythmic delta activity; FIRDA=frontal intermittent GRDA; LRDA=lateralized rhythmic delta activity; TIRDA=temporal intermittent rhythmic delta activity;  LPD=PLED=lateralized periodic discharges; GPD=generalized periodic discharges; BIPDs =bilateral independent periodic discharges; Mf=multifocal; SIRPDs=stimulus induced rhythmic, periodic, or ictal appearing discharges; BIRDs=brief potentially ictal rhythmic discharges >4 Hz, lasting .5-10s; PFA (paroxysmal bursts >13 Hz or =8 Hz <10s).  Modifiers: +F=with fast component; +S=with spike component; +R=with rhythmic component.  S-B=burst suppression pattern.  Max=maximal. N1-drowsy; N2-stage II sleep; N3-slow wave sleep. SSS/BETS=small sharp spikes/benign epileptiform transients of sleep. HV=hyperventilation; PS=photic stimulation]    FINDINGS:      Background:  Continuity: continuous  Symmetry: symmetric  PDR: 9-10 Hz activity, with amplitude to 40 uV, that attenuated to eye opening.    Reactivity: present  Voltage: normal (between 20-150uV)  Anterior Posterior Gradient: present  Other background findings: none  Breach: absent    Background Slowing:  Generalized slowing: intermittent delta/theta activity at times with semirhythmic appearance  Focal slowing: left frontotemporal irregular delta/theta activity was present.    State Changes:   -Drowsiness noted with increased slowing, attenuation of fast activity, vertex transients.  -Present with N2 sleep transients with symmetric spindles and K-complexes.    Sporadic/Rhythmic Epileptiform Discharges:    Frequent left frontotemporal (max F7/T7) spike wave discharges, intermittently with few seconds 0.5-1 Hz periodic pattern.    Electrographic and Electroclinical seizures:  No seizures during the current recording.    Other Clinical Events:  None    Activation Procedures:   -Hyperventilation was not performed.    -Photic stimulation was not performed.     Artifacts:  Intermittent myogenic and movement artifacts were noted.    ECG:  The heart rate on single channel ECG was predominantly between 60-70 BPM.    EEG Classification / Summary:  Abnormal EEG study  No seizures were observed during the current recording.  Frequent left frontotemporal (max F7/T7) spike wave discharges, intermittently with few seconds 0.5-1 Hz periodic pattern.  Left frontotemporal irregular delta/theta activity was present.  Intermittent generalized slowing, at times with semirhythmic appearance.  -----------------------------------------------------------------------------------------------------    Clinical Impression:  Increased risk for focal seizures left from frontotemporal region.  Focal cerebral dysfunction over left  temporal region.  Mild diffuse cerebral dysfunction, nonspecific.    Lilliam Mcmahan MD  Fellow, [facility]    Julio Hobson MD  EEG/Epilepsy Attending       -------------------------------------------------------------------------------------------------------  [facility] EEG Reading Room Ph#: [phone]  Epilepsy Answering Service after 5PM and before 8:30AM: Ph#: [phone]   RALPH BATES [id number]     Study Date: 	12-25-23 0800 - 1200  12-26-23  Duration in hours:  x 28 hr     --------------------------------------------------------------------------------------------------  History:  CC/ HPI Patient is a 48y old  Female who presents with a chief complaint of episode of unresponsiveness (26 Dec 2023 09:10)    MEDICATIONS  (STANDING):  enoxaparin Injectable 40 milliGRAM(s) SubCutaneous every 24 hours  levETIRAcetam 500 milliGRAM(s) Oral two times a day    --------------------------------------------------------------------------------------------------  Study Interpretation:    [Abbreviation Key:  PDR=alpha rhythm/posterior dominant rhythm. A-P=anterior posterior.  Amplitude: ‘very low’:<20; ‘low’:20-49; ‘medium’:; ‘high’:>150uV.  Persistence for periodic/rhythmic patterns (% of epoch) ‘rare’:<1%; ‘occasional’:1-10%; ‘frequent’:10-50%; ‘abundant’:50-90%; ‘continuous’:>90%.  Persistence for sporadic discharges: ‘rare’:<1/hr; ‘occasional’:1/min-1/hr; ‘frequent’:>1/min; ‘abundant’:>1/10 sec.  RPP=rhythmic and periodic patterns; GRDA=generalized rhythmic delta activity; FIRDA=frontal intermittent GRDA; LRDA=lateralized rhythmic delta activity; TIRDA=temporal intermittent rhythmic delta activity;  LPD=PLED=lateralized periodic discharges; GPD=generalized periodic discharges; BIPDs =bilateral independent periodic discharges; Mf=multifocal; SIRPDs=stimulus induced rhythmic, periodic, or ictal appearing discharges; BIRDs=brief potentially ictal rhythmic discharges >4 Hz, lasting .5-10s; PFA (paroxysmal bursts >13 Hz or =8 Hz <10s).  Modifiers: +F=with fast component; +S=with spike component; +R=with rhythmic component.  S-B=burst suppression pattern.  Max=maximal. N1-drowsy; N2-stage II sleep; N3-slow wave sleep. SSS/BETS=small sharp spikes/benign epileptiform transients of sleep. HV=hyperventilation; PS=photic stimulation]    FINDINGS:      Background:  Continuity: continuous  Symmetry: symmetric  PDR: 9-10 Hz activity, with amplitude to 40 uV, that attenuated to eye opening.    Reactivity: present  Voltage: normal (between 20-150uV)  Anterior Posterior Gradient: present  Other background findings: none  Breach: absent    Background Slowing:  Generalized slowing: intermittent delta/theta activity at times with semirhythmic appearance  Focal slowing: left frontotemporal irregular delta/theta activity was present.    State Changes:   -Drowsiness noted with increased slowing, attenuation of fast activity, vertex transients.  -Present with N2 sleep transients with symmetric spindles and K-complexes.    Sporadic/Rhythmic Epileptiform Discharges:    Frequent left frontotemporal (max F7/T7) spike wave discharges, intermittently with few seconds 0.5-1 Hz periodic pattern.    Electrographic and Electroclinical seizures:  No seizures during the current recording.    Other Clinical Events:  None    Activation Procedures:   -Hyperventilation was not performed.    -Photic stimulation was not performed.     Artifacts:  Intermittent myogenic and movement artifacts were noted.    ECG:  The heart rate on single channel ECG was predominantly between 60-70 BPM.    EEG Classification / Summary:  Abnormal EEG study  No seizures were observed during the current recording.  Frequent left frontotemporal (max F7/T7) spike wave discharges, intermittently with few seconds 0.5-1 Hz periodic pattern.  Left frontotemporal irregular delta/theta activity was present.  Intermittent generalized slowing, at times with semirhythmic appearance.  -----------------------------------------------------------------------------------------------------    Clinical Impression:  Increased risk for focal seizures left from frontotemporal region.  Focal cerebral dysfunction over left  temporal region.  Mild diffuse cerebral dysfunction, nonspecific.    Lilliam Mcmahan MD  Fellow, [facility]    Julio Hobson MD  EEG/Epilepsy Attending       -------------------------------------------------------------------------------------------------------  [facility] EEG Reading Room Ph#: [phone]  Epilepsy Answering Service after 5PM and before 8:30AM: Ph#: [phone]

## 2023-12-27 NOTE — PROGRESS NOTE ADULT - PROBLEM SELECTOR PLAN 1
Last thing pt remembers before awakening in the hospital was sitting down. Pt was told she had episode of LOC.   - CTP  Tmax greater then 6 seconds in the high frontal region slightly left and in the posterior fossa at the level of the brachium pontis bilaterally likely artifact. "  - UTox negative  - VEEG showed 3 focal left posterior temporal onset seizures as above. Increased risk for focal seizures from frontotemporal/posterior temporal region Focal cerebral dysfunction over temporal region.    Plan:  - c/w Keppra 750mg bid standing. continue VEEG. further plan pending VEEG findings.   - Seizure, fall and aspiration precautions  - f/u MRI brain

## 2023-12-27 NOTE — DISCHARGE NOTE NURSING/CASE MANAGEMENT/SOCIAL WORK - NSDCFUADDAPPT_GEN_ALL_CORE_FT
Follow up with neurology outpatient for a MRI  DO NOT DRIVE, operate heavy machinery, avoid heights, pools, bathtubs, locked doors until cleared by further follow up outpatient by neurology

## 2023-12-27 NOTE — DISCHARGE NOTE NURSING/CASE MANAGEMENT/SOCIAL WORK - PATIENT PORTAL LINK FT
You can access the FollowMyHealth Patient Portal offered by [facility] by registering at the following website: [email or web address]. By joining [employer]’s FollowMyHealth portal, you will also be able to view your health information using other applications (apps) compatible with our system. You can access the FollowMyHealth Patient Portal offered by [facility] by registering at the following website: [email or web address]. By joining [employer]’s FollowMyHealth portal, you will also be able to view your health information using other applications (apps) compatible with our system.

## 2023-12-27 NOTE — PROGRESS NOTE ADULT - ATTENDING COMMENTS
Seen and examined on rounds    48f with no PMH  no history head trauma, fhx seizures  presented with aphasia and then LOC  CT head unrevealing  VEEG with left frontal sharp waves and 3 short partial seizures on 12/26    INTERVAL HISTORY:  No seizures.  tolerated keppra    on exam she is alert and oriented, mild dysnomia  otherwise normal exam including CN, motor, sensory, DTR    apparent partial epilepsy new onset    dc veeg  clear for dc today with neuro f/u and outpatient MRI  dc on keppra 750 bid po  MRI brain outpatient  she does not drive .
Seen and examined on rounds    48f with no PMH  no history head trauma, fhx seizures  presented with aphasia and then LOC  CT head unrevealing  VEEG with left frontal sharp waves and 3 short partial seizures overnight    on exam she is alert and oriented, mild dysnomia  otherwise normal exam including CN, motor, sensory, DTR    apparent partial epilepsy new onset    continue veeg  start keppra 750 bid after IV load  MRI brain ordered  she does not drive

## 2023-12-27 NOTE — PROGRESS NOTE ADULT - REASON FOR ADMISSION
episode of unresponsiveness

## 2024-03-19 NOTE — ASU PREOP CHECKLIST - SITE MARKED BY SURGEON
CERTIFICATE OF WORK    February 10, 2021      Re: Naz Hobbs  [address] [place] [address]      This is to certify that Naz Hobbs has been under my care from 2/10/2021 and   is excused from work 2/10-2/12/21 for illness.            SIGNATURE:___________________________________________,   2/10/2021        Dipika Salas NP  Internal Medicine  [address] [place] [address] [phone]         No yes

## 2024-05-01 NOTE — H&P PST ADULT - NS PRO FEM REPRO HEALTH SCREEN
Stelara Counseling:  I discussed with the patient the risks of ustekinumab including but not limited to immunosuppression, malignancy, posterior leukoencephalopathy syndrome, and serious infections.  The patient understands that monitoring is required including a PPD at baseline and must alert us or the primary physician if symptoms of infection or other concerning signs are noted. Birth Control Pills Counseling: Birth Control Pill Counseling: I discussed with the patient the potential side effects of OCPs including but not limited to increased risk of stroke, heart attack, thrombophlebitis, deep venous thrombosis, hepatic adenomas, breast changes, GI upset, headaches, and depression.  The patient verbalized understanding of the proper use and possible adverse effects of OCPs. All of the patient's questions and concerns were addressed. Topical Ketoconazole Counseling: Patient counseled that this medication may cause skin irritation or allergic reactions.  In the event of skin irritation, the patient was advised to reduce the amount of the drug applied or use it less frequently.   The patient verbalized understanding of the proper use and possible adverse effects of ketoconazole.  All of the patient's questions and concerns were addressed. Otezla Counseling: The side effects of Otezla were discussed with the patient, including but not limited to worsening or new depression, weight loss, diarrhea, nausea, upper respiratory tract infection, and headache. Patient instructed to call the office should any adverse effect occur.  The patient verbalized understanding of the proper use and possible adverse effects of Otezla.  All the patient's questions and concerns were addressed. Hydroxychloroquine Pregnancy And Lactation Text: This medication has been shown to cause fetal harm but it isn't assigned a Pregnancy Risk Category. There are small amounts excreted in breast milk. Minoxidil Pregnancy And Lactation Text: This medication has not been assigned a Pregnancy Risk Category but animal studies failed to show danger with the topical medication. It is unknown if the medication is excreted in breast milk. Olumiant Pregnancy And Lactation Text: Based on animal studies, Olumiant may cause embryo-fetal harm when administered to pregnant women.  The medication should not be used in pregnancy.  Breastfeeding is not recommended during treatment. Cimetidine Counseling:  I discussed with the patient the risks of Cimetidine including but not limited to gynecomastia, headache, diarrhea, nausea, drowsiness, arrhythmias, pancreatitis, skin rashes, psychosis, bone marrow suppression and kidney toxicity. Picato Counseling:  I discussed with the patient the risks of Picato including but not limited to erythema, scaling, itching, weeping, crusting, and pain. Cephalexin Pregnancy And Lactation Text: This medication is Pregnancy Category B and considered safe during pregnancy.  It is also excreted in breast milk but can be used safely for shorter doses. Minoxidil Counseling: Minoxidil is a topical medication which can increase blood flow where it is applied. It is uncertain how this medication increases hair growth. Side effects are uncommon and include stinging and allergic reactions. Clofazimine Pregnancy And Lactation Text: This medication is Pregnancy Category C and isn't considered safe during pregnancy. It is excreted in breast milk. Humira Counseling:  I discussed with the patient the risks of adalimumab including but not limited to myelosuppression, immunosuppression, autoimmune hepatitis, demyelinating diseases, lymphoma, and serious infections.  The patient understands that monitoring is required including a PPD at baseline and must alert us or the primary physician if symptoms of infection or other concerning signs are noted. Tremfya Pregnancy And Lactation Text: The risk during pregnancy and breastfeeding is uncertain with this medication. Protopic Pregnancy And Lactation Text: This medication is Pregnancy Category C. It is unknown if this medication is excreted in breast milk when applied topically. Tranexamic Acid Counseling:  Patient advised of the small risk of bleeding problems with tranexamic acid. They were also instructed to call if they developed any nausea, vomiting or diarrhea. All of the patient's questions and concerns were addressed. Dapsone Counseling: I discussed with the patient the risks of dapsone including but not limited to hemolytic anemia, agranulocytosis, rashes, methemoglobinemia, kidney failure, peripheral neuropathy, headaches, GI upset, and liver toxicity.  Patients who start dapsone require monitoring including baseline LFTs and weekly CBCs for the first month, then every month thereafter.  The patient verbalized understanding of the proper use and possible adverse effects of dapsone.  All of the patient's questions and concerns were addressed. Otezla Pregnancy And Lactation Text: This medication is Pregnancy Category C and it isn't known if it is safe during pregnancy. It is unknown if it is excreted in breast milk. Rifampin Counseling: I discussed with the patient the risks of rifampin including but not limited to liver damage, kidney damage, red-orange body fluids, nausea/vomiting and severe allergy. Methotrexate Pregnancy And Lactation Text: This medication is Pregnancy Category X and is known to cause fetal harm. This medication is excreted in breast milk. Birth Control Pills Pregnancy And Lactation Text: This medication should be avoided if pregnant and for the first 30 days post-partum. Klisyri Counseling:  I discussed with the patient the risks of Klisyri including but not limited to erythema, scaling, itching, weeping, crusting, and pain. Bactrim Counseling:  I discussed with the patient the risks of sulfa antibiotics including but not limited to GI upset, allergic reaction, drug rash, diarrhea, dizziness, photosensitivity, and yeast infections.  Rarely, more serious reactions can occur including but not limited to aplastic anemia, agranulocytosis, methemoglobinemia, blood dyscrasias, liver or kidney failure, lung infiltrates or desquamative/blistering drug rashes. Opioid Pregnancy And Lactation Text: These medications can lead to premature delivery and should be avoided during pregnancy. These medications are also present in breast milk in small amounts. Ivermectin Pregnancy And Lactation Text: This medication is Pregnancy Category C and it isn't known if it is safe during pregnancy. It is also excreted in breast milk. Tazorac Pregnancy And Lactation Text: This medication is not safe during pregnancy. It is unknown if this medication is excreted in breast milk. Winlevi Pregnancy And Lactation Text: This medication is considered safe during pregnancy and breastfeeding. Topical Sulfur Applications Counseling: Topical Sulfur Counseling: Patient counseled that this medication may cause skin irritation or allergic reactions.  In the event of skin irritation, the patient was advised to reduce the amount of the drug applied or use it less frequently.   The patient verbalized understanding of the proper use and possible adverse effects of topical sulfur application.  All of the patient's questions and concerns were addressed. Azathioprine Pregnancy And Lactation Text: This medication is Pregnancy Category D and isn't considered safe during pregnancy. It is unknown if this medication is excreted in breast milk. Spironolactone Counseling: Patient advised regarding risks of diarrhea, abdominal pain, hyperkalemia, birth defects (for female patients), liver toxicity and renal toxicity. The patient may need blood work to monitor liver and kidney function and potassium levels while on therapy. The patient verbalized understanding of the proper use and possible adverse effects of spironolactone.  All of the patient's questions and concerns were addressed. Rituxan Pregnancy And Lactation Text: This medication is Pregnancy Category C and it isn't know if it is safe during pregnancy. It is unknown if this medication is excreted in breast milk but similar antibodies are known to be excreted. Cyclosporine Pregnancy And Lactation Text: This medication is Pregnancy Category C and it isn't know if it is safe during pregnancy. This medication is excreted in breast milk. Cyclosporine Counseling:  I discussed with the patient the risks of cyclosporine including but not limited to hypertension, gingival hyperplasia,myelosuppression, immunosuppression, liver damage, kidney damage, neurotoxicity, lymphoma, and serious infections. The patient understands that monitoring is required including baseline blood pressure, CBC, CMP, lipid panel and uric acid, and then 1-2 times monthly CMP and blood pressure. Infliximab Pregnancy And Lactation Text: This medication is Pregnancy Category B and is considered safe during pregnancy. It is unknown if this medication is excreted in breast milk. Cibinqo Pregnancy And Lactation Text: It is unknown if this medication will adversely affect pregnancy or breast feeding.  You should not take this medication if you are currently pregnant or planning a pregnancy or while breastfeeding. Tranexamic Acid Pregnancy And Lactation Text: It is unknown if this medication is safe during pregnancy or breast feeding. High Dose Vitamin A Counseling: Side effects reviewed, pt to contact office should one occur. Benzoyl Peroxide Pregnancy And Lactation Text: This medication is Pregnancy Category C. It is unknown if benzoyl peroxide is excreted in breast milk. Protopic Counseling: Patient may experience a mild burning sensation during topical application. Protopic is not approved in children less than 2 years of age. There have been case reports of hematologic and skin malignancies in patients using topical calcineurin inhibitors although causality is questionable. Doxycycline Counseling:  Patient counseled regarding possible photosensitivity and increased risk for sunburn.  Patient instructed to avoid sunlight, if possible.  When exposed to sunlight, patients should wear protective clothing, sunglasses, and sunscreen.  The patient was instructed to call the office immediately if the following severe adverse effects occur:  hearing changes, easy bruising/bleeding, severe headache, or vision changes.  The patient verbalized understanding of the proper use and possible adverse effects of doxycycline.  All of the patient's questions and concerns were addressed. Xolair Counseling:  Patient informed of potential adverse effects including but not limited to fever, muscle aches, rash and allergic reactions.  The patient verbalized understanding of the proper use and possible adverse effects of Xolair.  All of the patient's questions and concerns were addressed. Tremfya Counseling: I discussed with the patient the risks of guselkumab including but not limited to immunosuppression, serious infections, worsening of inflammatory bowel disease and drug reactions.  The patient understands that monitoring is required including a PPD at baseline and must alert us or the primary physician if symptoms of infection or other concerning signs are noted. Albendazole Counseling:  I discussed with the patient the risks of albendazole including but not limited to cytopenia, kidney damage, nausea/vomiting and severe allergy.  The patient understands that this medication is being used in an off-label manner. Arava Counseling:  Patient counseled regarding adverse effects of Arava including but not limited to nausea, vomiting, abnormalities in liver function tests. Patients may develop mouth sores, rash, diarrhea, and abnormalities in blood counts. The patient understands that monitoring is required including LFTs and blood counts.  There is a rare possibility of scarring of the liver and lung problems that can occur when taking methotrexate. Persistent nausea, loss of appetite, pale stools, dark urine, cough, and shortness of breath should be reported immediately. Patient advised to discontinue Arava treatment and consult with a physician prior to attempting conception. The patient will have to undergo a treatment to eliminate Arava from the body prior to conception. Erivedge Pregnancy And Lactation Text: This medication is Pregnancy Category X and is absolutely contraindicated during pregnancy. It is unknown if it is excreted in breast milk. Gabapentin Counseling: I discussed with the patient the risks of gabapentin including but not limited to dizziness, somnolence, fatigue and ataxia. Quinolones Counseling:  I discussed with the patient the risks of fluoroquinolones including but not limited to GI upset, allergic reaction, drug rash, diarrhea, dizziness, photosensitivity, yeast infections, liver function test abnormalities, tendonitis/tendon rupture. Odomzo Counseling- I discussed with the patient the risks of Odomzo including but not limited to nausea, vomiting, diarrhea, constipation, weight loss, changes in the sense of taste, decreased appetite, muscle spasms, and hair loss.  The patient verbalized understanding of the proper use and possible adverse effects of Odomzo.  All of the patient's questions and concerns were addressed. Topical Retinoid Pregnancy And Lactation Text: This medication is Pregnancy Category C. It is unknown if this medication is excreted in breast milk. 5-Fu Pregnancy And Lactation Text: This medication is Pregnancy Category X and contraindicated in pregnancy and in women who may become pregnant. It is unknown if this medication is excreted in breast milk. Clindamycin Pregnancy And Lactation Text: This medication can be used in pregnancy if certain situations. Clindamycin is also present in breast milk. Propranolol Counseling:  I discussed with the patient the risks of propranolol including but not limited to low heart rate, low blood pressure, low blood sugar, restlessness and increased cold sensitivity. They should call the office if they experience any of these side effects. Dupixent Pregnancy And Lactation Text: This medication likely crosses the placenta but the risk for the fetus is uncertain. This medication is excreted in breast milk. Metronidazole Pregnancy And Lactation Text: This medication is Pregnancy Category B and considered safe during pregnancy.  It is also excreted in breast milk. Terbinafine Pregnancy And Lactation Text: This medication is Pregnancy Category B and is considered safe during pregnancy. It is also excreted in breast milk and breast feeding isn't recommended. Hydroxychloroquine Counseling:  I discussed with the patient that a baseline ophthalmologic exam is needed at the start of therapy and every year thereafter while on therapy. A CBC may also be warranted for monitoring.  The side effects of this medication were discussed with the patient, including but not limited to agranulocytosis, aplastic anemia, seizures, rashes, retinopathy, and liver toxicity. Patient instructed to call the office should any adverse effect occur.  The patient verbalized understanding of the proper use and possible adverse effects of Plaquenil.  All the patient's questions and concerns were addressed. Detail Level: Detailed Rhofade Pregnancy And Lactation Text: This medication has not been assigned a Pregnancy Risk Category. It is unknown if the medication is excreted in breast milk. Include Pregnancy/Lactation Warning?: No Wartpeel Counseling:  I discussed with the patient the risks of Wartpeel including but not limited to erythema, scaling, itching, weeping, crusting, and pain. Xolair Pregnancy And Lactation Text: This medication is Pregnancy Category B and is considered safe during pregnancy. This medication is excreted in breast milk. Libtayo Counseling- I discussed with the patient the risks of Libtayo including but not limited to nausea, vomiting, diarrhea, and bone or muscle pain.  The patient verbalized understanding of the proper use and possible adverse effects of Libtayo.  All of the patient's questions and concerns were addressed. Acitretin Counseling:  I discussed with the patient the risks of acitretin including but not limited to hair loss, dry lips/skin/eyes, liver damage, hyperlipidemia, depression/suicidal ideation, photosensitivity.  Serious rare side effects can include but are not limited to pancreatitis, pseudotumor cerebri, bony changes, clot formation/stroke/heart attack.  Patient understands that alcohol is contraindicated since it can result in liver toxicity and significantly prolong the elimination of the drug by many years. Fluconazole Pregnancy And Lactation Text: This medication is Pregnancy Category C and it isn't know if it is safe during pregnancy. It is also excreted in breast milk. Finasteride Pregnancy And Lactation Text: This medication is absolutely contraindicated during pregnancy. It is unknown if it is excreted in breast milk. Rinvoq Counseling: I discussed with the patient the risks of Rinvoq therapy including but not limited to upper respiratory tract infections, shingles, cold sores, bronchitis, nausea, cough, fever, acne, and headache. Live vaccines should be avoided.  This medication has been linked to serious infections; higher rate of mortality; malignancy and lymphoproliferative disorders; major adverse cardiovascular events; thrombosis; thrombocytopenia, anemia, and neutropenia; lipid elevations; liver enzyme elevations; and gastrointestinal perforations. Zyclara Counseling:  I discussed with the patient the risks of imiquimod including but not limited to erythema, scaling, itching, weeping, crusting, and pain.  Patient understands that the inflammatory response to imiquimod is variable from person to person and was educated regarded proper titration schedule.  If flu-like symptoms develop, patient knows to discontinue the medication and contact us. Opioid Counseling: I discussed with the patient the potential side effects of opioids including but not limited to addiction, altered mental status, and depression. I stressed avoiding alcohol, benzodiazepines, muscle relaxants and sleep aids unless specifically okayed by a physician. The patient verbalized understanding of the proper use and possible adverse effects of opioids. All of the patient's questions and concerns were addressed. They were instructed to flush the remaining pills down the toilet if they did not need them for pain. Rituxan Counseling:  I discussed with the patient the risks of Rituxan infusions. Side effects can include infusion reactions, severe drug rashes including mucocutaneous reactions, reactivation of latent hepatitis and other infections and rarely progressive multifocal leukoencephalopathy.  All of the patient's questions and concerns were addressed. Cimzia Counseling:  I discussed with the patient the risks of Cimzia including but not limited to immunosuppression, allergic reactions and infections.  The patient understands that monitoring is required including a PPD at baseline and must alert us or the primary physician if symptoms of infection or other concerning signs are noted. Clindamycin Counseling: I counseled the patient regarding use of clindamycin as an antibiotic for prophylactic and/or therapeutic purposes. Clindamycin is active against numerous classes of bacteria, including skin bacteria. Side effects may include nausea, diarrhea, gastrointestinal upset, rash, hives, yeast infections, and in rare cases, colitis. Niacinamide Pregnancy And Lactation Text: These medications are considered safe during pregnancy. Hydroxyzine Pregnancy And Lactation Text: This medication is not safe during pregnancy and should not be taken. It is also excreted in breast milk and breast feeding isn't recommended. Cyclophosphamide Counseling:  I discussed with the patient the risks of cyclophosphamide including but not limited to hair loss, hormonal abnormalities, decreased fertility, abdominal pain, diarrhea, nausea and vomiting, bone marrow suppression and infection. The patient understands that monitoring is required while taking this medication. Ketoconazole Counseling:   Patient counseled regarding improving absorption with orange juice.  Adverse effects include but are not limited to breast enlargement, headache, diarrhea, nausea, upset stomach, liver function test abnormalities, taste disturbance, and stomach pain.  There is a rare possibility of liver failure that can occur when taking ketoconazole. The patient understands that monitoring of LFTs may be required, especially at baseline. The patient verbalized understanding of the proper use and possible adverse effects of ketoconazole.  All of the patient's questions and concerns were addressed. High Dose Vitamin A Pregnancy And Lactation Text: High dose vitamin A therapy is contraindicated during pregnancy and breast feeding. Dutasteride Male Counseling: Dustasteride Counseling:  I discussed with the patient the risks of use of dutasteride including but not limited to decreased libido, decreased ejaculate volume, and gynecomastia. Women who can become pregnant should not handle medication.  All of the patient's questions and concerns were addressed. Cellcept Counseling:  I discussed with the patient the risks of mycophenolate mofetil including but not limited to infection/immunosuppression, GI upset, hypokalemia, hypercholesterolemia, bone marrow suppression, lymphoproliferative disorders, malignancy, GI ulceration/bleed/perforation, colitis, interstitial lung disease, kidney failure, progressive multifocal leukoencephalopathy, and birth defects.  The patient understands that monitoring is required including a baseline creatinine and regular CBC testing. In addition, patient must alert us immediately if symptoms of infection or other concerning signs are noted. Thalidomide Counseling: I discussed with the patient the risks of thalidomide including but not limited to birth defects, anxiety, weakness, chest pain, dizziness, cough and severe allergy. Valtrex Pregnancy And Lactation Text: this medication is Pregnancy Category B and is considered safe during pregnancy. This medication is not directly found in breast milk but it's metabolite acyclovir is present. Bexarotene Pregnancy And Lactation Text: This medication is Pregnancy Category X and should not be given to women who are pregnant or may become pregnant. This medication should not be used if you are breast feeding. Sarecycline Counseling: Patient advised regarding possible photosensitivity and discoloration of the teeth, skin, lips, tongue and gums.  Patient instructed to avoid sunlight, if possible.  When exposed to sunlight, patients should wear protective clothing, sunglasses, and sunscreen.  The patient was instructed to call the office immediately if the following severe adverse effects occur:  hearing changes, easy bruising/bleeding, severe headache, or vision changes.  The patient verbalized understanding of the proper use and possible adverse effects of sarecycline.  All of the patient's questions and concerns were addressed. Klisyri Pregnancy And Lactation Text: It is unknown if this medication can harm a developing fetus or if it is excreted in breast milk. Topical Retinoid counseling:  Patient advised to apply a pea-sized amount only at bedtime and wait 30 minutes after washing their face before applying.  If too drying, patient may add a non-comedogenic moisturizer. The patient verbalized understanding of the proper use and possible adverse effects of retinoids.  All of the patient's questions and concerns were addressed. Cephalexin Counseling: I counseled the patient regarding use of cephalexin as an antibiotic for prophylactic and/or therapeutic purposes. Cephalexin (commonly prescribed under brand name Keflex) is a cephalosporin antibiotic which is active against numerous classes of bacteria, including most skin bacteria. Side effects may include nausea, diarrhea, gastrointestinal upset, rash, hives, yeast infections, and in rare cases, hepatitis, kidney disease, seizures, fever, confusion, neurologic symptoms, and others. Patients with severe allergies to penicillin medications are cautioned that there is about a 10% incidence of cross-reactivity with cephalosporins. When possible, patients with penicillin allergies should use alternatives to cephalosporins for antibiotic therapy. Azithromycin Pregnancy And Lactation Text: This medication is considered safe during pregnancy and is also secreted in breast milk. Colchicine Counseling:  Patient counseled regarding adverse effects including but not limited to stomach upset (nausea, vomiting, stomach pain, or diarrhea).  Patient instructed to limit alcohol consumption while taking this medication.  Colchicine may reduce blood counts especially with prolonged use.  The patient understands that monitoring of kidney function and blood counts may be required, especially at baseline. The patient verbalized understanding of the proper use and possible adverse effects of colchicine.  All of the patient's questions and concerns were addressed. Aklief Pregnancy And Lactation Text: It is unknown if this medication is safe to use during pregnancy.  It is unknown if this medication is excreted in breast milk.  Breastfeeding women should use the topical cream on the smallest area of the skin for the shortest time needed while breastfeeding.  Do not apply to nipple and areola. Metronidazole Counseling:  I discussed with the patient the risks of metronidazole including but not limited to seizures, nausea/vomiting, a metallic taste in the mouth, nausea/vomiting and severe allergy. Finasteride Male Counseling: Finasteride Counseling:  I discussed with the patient the risks of use of finasteride including but not limited to decreased libido, decreased ejaculate volume, gynecomastia, and depression. Women should not handle medication.  All of the patient's questions and concerns were addressed. Solaraze Pregnancy And Lactation Text: This medication is Pregnancy Category B and is considered safe. There is some data to suggest avoiding during the third trimester. It is unknown if this medication is excreted in breast milk. Griseofulvin Pregnancy And Lactation Text: This medication is Pregnancy Category X and is known to cause serious birth defects. It is unknown if this medication is excreted in breast milk but breast feeding should be avoided. Glycopyrrolate Counseling:  I discussed with the patient the risks of glycopyrrolate including but not limited to skin rash, drowsiness, dry mouth, difficulty urinating, and blurred vision. Itraconazole Counseling:  I discussed with the patient the risks of itraconazole including but not limited to liver damage, nausea/vomiting, neuropathy, and severe allergy.  The patient understands that this medication is best absorbed when taken with acidic beverages such as non-diet cola or ginger ale.  The patient understands that monitoring is required including baseline LFTs and repeat LFTs at intervals.  The patient understands that they are to contact us or the primary physician if concerning signs are noted. Aklief counseling:  Patient advised to apply a pea-sized amount only at bedtime and wait 30 minutes after washing their face before applying.  If too drying, patient may add a non-comedogenic moisturizer.  The most commonly reported side effects including irritation, redness, scaling, dryness, stinging, burning, itching, and increased risk of sunburn.  The patient verbalized understanding of the proper use and possible adverse effects of retinoids.  All of the patient's questions and concerns were addressed. Tetracycline Counseling: Patient counseled regarding possible photosensitivity and increased risk for sunburn.  Patient instructed to avoid sunlight, if possible.  When exposed to sunlight, patients should wear protective clothing, sunglasses, and sunscreen.  The patient was instructed to call the office immediately if the following severe adverse effects occur:  hearing changes, easy bruising/bleeding, severe headache, or vision changes.  The patient verbalized understanding of the proper use and possible adverse effects of tetracycline.  All of the patient's questions and concerns were addressed. Patient understands to avoid pregnancy while on therapy due to potential birth defects. Sarecycline Pregnancy And Lactation Text: This medication is Pregnancy Category D and not consider safe during pregnancy. It is also excreted in breast milk. Isotretinoin Counseling: Patient should get monthly blood tests, not donate blood, not drive at night if vision affected, not share medication, and not undergo elective surgery for 6 months after tx completed. Side effects reviewed, pt to contact office should one occur. Ilumya Counseling: I discussed with the patient the risks of tildrakizumab including but not limited to immunosuppression, malignancy, posterior leukoencephalopathy syndrome, and serious infections.  The patient understands that monitoring is required including a PPD at baseline and must alert us or the primary physician if symptoms of infection or other concerning signs are noted. Opzelura Pregnancy And Lactation Text: There is insufficient data to evaluate drug-associated risk for major birth defects, miscarriage, or other adverse maternal or fetal outcomes.  There is a pregnancy registry that monitors pregnancy outcomes in pregnant persons exposed to the medication during pregnancy.  It is unknown if this medication is excreted in breast milk.  Do not breastfeed during treatment and for about 4 weeks after the last dose. Valtrex Counseling: I discussed with the patient the risks of valacyclovir including but not limited to kidney damage, nausea, vomiting and severe allergy.  The patient understands that if the infection seems to be worsening or is not improving, they are to call. Acitretin Pregnancy And Lactation Text: This medication is Pregnancy Category X and should not be given to women who are pregnant or may become pregnant in the future. This medication is excreted in breast milk. Azelaic Acid Counseling: Patient counseled that medicine may cause skin irritation and to avoid applying near the eyes.  In the event of skin irritation, the patient was advised to reduce the amount of the drug applied or use it less frequently.   The patient verbalized understanding of the proper use and possible adverse effects of azelaic acid.  All of the patient's questions and concerns were addressed. Topical Clindamycin Pregnancy And Lactation Text: This medication is Pregnancy Category B and is considered safe during pregnancy. It is unknown if it is excreted in breast milk. Skyrizi Counseling: I discussed with the patient the risks of risankizumab-rzaa including but not limited to immunosuppression, and serious infections.  The patient understands that monitoring is required including a PPD at baseline and must alert us or the primary physician if symptoms of infection or other concerning signs are noted. Topical Clindamycin Counseling: Patient counseled that this medication may cause skin irritation or allergic reactions.  In the event of skin irritation, the patient was advised to reduce the amount of the drug applied or use it less frequently.   The patient verbalized understanding of the proper use and possible adverse effects of clindamycin.  All of the patient's questions and concerns were addressed. Erivedge Counseling- I discussed with the patient the risks of Erivedge including but not limited to nausea, vomiting, diarrhea, constipation, weight loss, changes in the sense of taste, decreased appetite, muscle spasms, and hair loss.  The patient verbalized understanding of the proper use and possible adverse effects of Erivedge.  All of the patient's questions and concerns were addressed. Niacinamide Counseling: I recommended taking niacin or niacinamide, also know as vitamin B3, twice daily. Recent evidence suggests that taking vitamin B3 (500 mg twice daily) can reduce the risk of actinic keratoses and non-melanoma skin cancers. Side effects of vitamin B3 include flushing and headache. Mirvaso Counseling: Mirvaso is a topical medication which can decrease superficial blood flow where applied. Side effects are uncommon and include stinging, redness and allergic reactions. Imiquimod Counseling:  I discussed with the patient the risks of imiquimod including but not limited to erythema, scaling, itching, weeping, crusting, and pain.  Patient understands that the inflammatory response to imiquimod is variable from person to person and was educated regarded proper titration schedule.  If flu-like symptoms develop, patient knows to discontinue the medication and contact us. Dapsone Pregnancy And Lactation Text: This medication is Pregnancy Category C and is not considered safe during pregnancy or breast feeding. Hydroxyzine Counseling: Patient advised that the medication is sedating and not to drive a car after taking this medication.  Patient informed of potential adverse effects including but not limited to dry mouth, urinary retention, and blurry vision.  The patient verbalized understanding of the proper use and possible adverse effects of hydroxyzine.  All of the patient's questions and concerns were addressed. Fluconazole Counseling:  Patient counseled regarding adverse effects of fluconazole including but not limited to headache, diarrhea, nausea, upset stomach, liver function test abnormalities, taste disturbance, and stomach pain.  There is a rare possibility of liver failure that can occur when taking fluconazole.  The patient understands that monitoring of LFTs and kidney function test may be required, especially at baseline. The patient verbalized understanding of the proper use and possible adverse effects of fluconazole.  All of the patient's questions and concerns were addressed. Adbry Pregnancy And Lactation Text: It is unknown if this medication will adversely affect pregnancy or breast feeding. Opzelura Counseling:  I discussed with the patient the risks of Opzelura including but not limited to nasopharngitis, bronchitis, ear infection, eosinophila, hives, diarrhea, folliculitis, tonsillitis, and rhinorrhea.  Taken orally, this medication has been linked to serious infections; higher rate of mortality; malignancy and lymphoproliferative disorders; major adverse cardiovascular events; thrombosis; thrombocytopenia, anemia, and neutropenia; and lipid elevations. Adbry Counseling: I discussed with the patient the risks of tralokinumab including but not limited to eye infection and irritation, cold sores, injection site reactions, worsening of asthma, allergic reactions and increased risk of parasitic infection.  Live vaccines should be avoided while taking tralokinumab. The patient understands that monitoring is required and they must alert us or the primary physician if symptoms of infection or other concerning signs are noted. Methotrexate Counseling:  Patient counseled regarding adverse effects of methotrexate including but not limited to nausea, vomiting, abnormalities in liver function tests. Patients may develop mouth sores, rash, diarrhea, and abnormalities in blood counts. The patient understands that monitoring is required including LFT's and blood counts.  There is a rare possibility of scarring of the liver and lung problems that can occur when taking methotrexate. Persistent nausea, loss of appetite, pale stools, dark urine, cough, and shortness of breath should be reported immediately. Patient advised to discontinue methotrexate treatment at least three months before attempting to become pregnant.  I discussed the need for folate supplements while taking methotrexate.  These supplements can decrease side effects during methotrexate treatment. The patient verbalized understanding of the proper use and possible adverse effects of methotrexate.  All of the patient's questions and concerns were addressed. Drysol Pregnancy And Lactation Text: This medication is considered safe during pregnancy and breast feeding. Qbrexza Counseling:  I discussed with the patient the risks of Qbrexza including but not limited to headache, mydriasis, blurred vision, dry eyes, nasal dryness, dry mouth, dry throat, dry skin, urinary hesitation, and constipation.  Local skin reactions including erythema, burning, stinging, and itching can also occur. Minocycline Counseling: Patient advised regarding possible photosensitivity and discoloration of the teeth, skin, lips, tongue and gums.  Patient instructed to avoid sunlight, if possible.  When exposed to sunlight, patients should wear protective clothing, sunglasses, and sunscreen.  The patient was instructed to call the office immediately if the following severe adverse effects occur:  hearing changes, easy bruising/bleeding, severe headache, or vision changes.  The patient verbalized understanding of the proper use and possible adverse effects of minocycline.  All of the patient's questions and concerns were addressed. Isotretinoin Pregnancy And Lactation Text: This medication is Pregnancy Category X and is considered extremely dangerous during pregnancy. It is unknown if it is excreted in breast milk. Doxycycline Pregnancy And Lactation Text: This medication is Pregnancy Category D and not consider safe during pregnancy. It is also excreted in breast milk but is considered safe for shorter treatment courses. Azathioprine Counseling:  I discussed with the patient the risks of azathioprine including but not limited to myelosuppression, immunosuppression, hepatotoxicity, lymphoma, and infections.  The patient understands that monitoring is required including baseline LFTs, Creatinine, possible TPMP genotyping and weekly CBCs for the first month and then every 2 weeks thereafter.  The patient verbalized understanding of the proper use and possible adverse effects of azathioprine.  All of the patient's questions and concerns were addressed. Oxybutynin Counseling:  I discussed with the patient the risks of oxybutynin including but not limited to skin rash, drowsiness, dry mouth, difficulty urinating, and blurred vision. Nsaids Counseling: NSAID Counseling: I discussed with the patient that NSAIDs should be taken with food. Prolonged use of NSAIDs can result in the development of stomach ulcers.  Patient advised to stop taking NSAIDs if abdominal pain occurs.  The patient verbalized understanding of the proper use and possible adverse effects of NSAIDs.  All of the patient's questions and concerns were addressed. Hydroquinone Counseling:  Patient advised that medication may result in skin irritation, lightening (hypopigmentation), dryness, and burning.  In the event of skin irritation, the patient was advised to reduce the amount of the drug applied or use it less frequently.  Rarely, spots that are treated with hydroquinone can become darker (pseudoochronosis).  Should this occur, patient instructed to stop medication and call the office. The patient verbalized understanding of the proper use and possible adverse effects of hydroquinone.  All of the patient's questions and concerns were addressed. 5-Fu Counseling: 5-Fluorouracil Counseling:  I discussed with the patient the risks of 5-fluorouracil including but not limited to erythema, scaling, itching, weeping, crusting, and pain. Dutasteride Pregnancy And Lactation Text: This medication is absolutely contraindicated in women, especially during pregnancy and breast feeding. Feminization of male fetuses is possible if taking while pregnant. Libtayo Pregnancy And Lactation Text: This medication is contraindicated in pregnancy and when breast feeding. Prednisone Counseling:  I discussed with the patient the risks of prolonged use of prednisone including but not limited to weight gain, insomnia, osteoporosis, mood changes, diabetes, susceptibility to infection, glaucoma and high blood pressure.  In cases where prednisone use is prolonged, patients should be monitored with blood pressure checks, serum glucose levels and an eye exam.  Additionally, the patient may need to be placed on GI prophylaxis, PCP prophylaxis, and calcium and vitamin D supplementation and/or a bisphosphonate.  The patient verbalized understanding of the proper use and the possible adverse effects of prednisone.  All of the patient's questions and concerns were addressed. Benzoyl Peroxide Counseling: Patient counseled that medicine may cause skin irritation and bleach clothing.  In the event of skin irritation, the patient was advised to reduce the amount of the drug applied or use it less frequently.   The patient verbalized understanding of the proper use and possible adverse effects of benzoyl peroxide.  All of the patient's questions and concerns were addressed. Oral Minoxidil Counseling- I discussed with the patient the risks of oral minoxidil including but not limited to shortness of breath, swelling of the feet or ankles, dizziness, lightheadedness, unwanted hair growth and allergic reaction.  The patient verbalized understanding of the proper use and possible adverse effects of oral minoxidil.  All of the patient's questions and concerns were addressed. Drysol Counseling:  I discussed with the patient the risks of drysol/aluminum chloride including but not limited to skin rash, itching, irritation, burning. mammogram Doxepin Pregnancy And Lactation Text: This medication is Pregnancy Category C and it isn't known if it is safe during pregnancy. It is also excreted in breast milk and breast feeding isn't recommended. Enbrel Counseling:  I discussed with the patient the risks of etanercept including but not limited to myelosuppression, immunosuppression, autoimmune hepatitis, demyelinating diseases, lymphoma, and infections.  The patient understands that monitoring is required including a PPD at baseline and must alert us or the primary physician if symptoms of infection or other concerning signs are noted. Glycopyrrolate Pregnancy And Lactation Text: This medication is Pregnancy Category B and is considered safe during pregnancy. It is unknown if it is excreted breast milk. SSKI Counseling:  I discussed with the patient the risks of SSKI including but not limited to thyroid abnormalities, metallic taste, GI upset, fever, headache, acne, arthralgias, paraesthesias, lymphadenopathy, easy bleeding, arrhythmias, and allergic reaction. Rinvoq Pregnancy And Lactation Text: Based on animal studies, Rinvoq may cause embryo-fetal harm when administered to pregnant women.  The medication should not be used in pregnancy.  Breastfeeding is not recommended during treatment and for 6 days after the last dose. Bexarotene Counseling:  I discussed with the patient the risks of bexarotene including but not limited to hair loss, dry lips/skin/eyes, liver abnormalities, hyperlipidemia, pancreatitis, depression/suicidal ideation, photosensitivity, drug rash/allergic reactions, hypothyroidism, anemia, leukopenia, infection, cataracts, and teratogenicity.  Patient understands that they will need regular blood tests to check lipid profile, liver function tests, white blood cell count, thyroid function tests and pregnancy test if applicable. Topical Sulfur Applications Pregnancy And Lactation Text: This medication is Pregnancy Category C and has an unknown safety profile during pregnancy. It is unknown if this topical medication is excreted in breast milk. Dupixent Counseling: I discussed with the patient the risks of dupilumab including but not limited to eye infection and irritation, cold sores, injection site reactions, worsening of asthma, allergic reactions and increased risk of parasitic infection.  Live vaccines should be avoided while taking dupilumab. Dupilumab will also interact with certain medications such as warfarin and cyclosporine. The patient understands that monitoring is required and they must alert us or the primary physician if symptoms of infection or other concerning signs are noted. Solaraze Counseling:  I discussed with the patient the risks of Solaraze including but not limited to erythema, scaling, itching, weeping, crusting, and pain. Griseofulvin Counseling:  I discussed with the patient the risks of griseofulvin including but not limited to photosensitivity, cytopenia, liver damage, nausea/vomiting and severe allergy.  The patient understands that this medication is best absorbed when taken with a fatty meal (e.g., ice cream or french fries). Propranolol Pregnancy And Lactation Text: This medication is Pregnancy Category C and it isn't known if it is safe during pregnancy. It is excreted in breast milk. Olumiant Counseling: I discussed with the patient the risks of Olumiant therapy including but not limited to upper respiratory tract infections, shingles, cold sores, and nausea. Live vaccines should be avoided.  This medication has been linked to serious infections; higher rate of mortality; malignancy and lymphoproliferative disorders; major adverse cardiovascular events; thrombosis; gastrointestinal perforations; neutropenia; lymphopenia; anemia; liver enzyme elevations; and lipid elevations. Siliq Counseling:  I discussed with the patient the risks of Siliq including but not limited to new or worsening depression, suicidal thoughts and behavior, immunosuppression, malignancy, posterior leukoencephalopathy syndrome, and serious infections.  The patient understands that monitoring is required including a PPD at baseline and must alert us or the primary physician if symptoms of infection or other concerning signs are noted. There is also a special program designed to monitor depression which is required with Siliq. Erythromycin Pregnancy And Lactation Text: This medication is Pregnancy Category B and is considered safe during pregnancy. It is also excreted in breast milk. Eucrisa Counseling: Patient may experience a mild burning sensation during topical application. Eucrisa is not approved in children less than 2 years of age. Bactrim Pregnancy And Lactation Text: This medication is Pregnancy Category D and is known to cause fetal risk.  It is also excreted in breast milk. Simponi Counseling:  I discussed with the patient the risks of golimumab including but not limited to myelosuppression, immunosuppression, autoimmune hepatitis, demyelinating diseases, lymphoma, and serious infections.  The patient understands that monitoring is required including a PPD at baseline and must alert us or the primary physician if symptoms of infection or other concerning signs are noted. Taltz Counseling: I discussed with the patient the risks of ixekizumab including but not limited to immunosuppression, serious infections, worsening of inflammatory bowel disease and drug reactions.  The patient understands that monitoring is required including a PPD at baseline and must alert us or the primary physician if symptoms of infection or other concerning signs are noted. Oral Minoxidil Pregnancy And Lactation Text: This medication should only be used when clearly needed if you are pregnant, attempting to become pregnant or breast feeding. Cyclophosphamide Pregnancy And Lactation Text: This medication is Pregnancy Category D and it isn't considered safe during pregnancy. This medication is excreted in breast milk. Terbinafine Counseling: Patient counseling regarding adverse effects of terbinafine including but not limited to headache, diarrhea, rash, upset stomach, liver function test abnormalities, itching, taste/smell disturbance, nausea, abdominal pain, and flatulence.  There is a rare possibility of liver failure that can occur when taking terbinafine.  The patient understands that a baseline LFT and kidney function test may be required. The patient verbalized understanding of the proper use and possible adverse effects of terbinafine.  All of the patient's questions and concerns were addressed. Rifampin Pregnancy And Lactation Text: This medication is Pregnancy Category C and it isn't know if it is safe during pregnancy. It is also excreted in breast milk and should not be used if you are breast feeding. Clofazimine Counseling:  I discussed with the patient the risks of clofazimine including but not limited to skin and eye pigmentation, liver damage, nausea/vomiting, gastrointestinal bleeding and allergy. Carac Counseling:  I discussed with the patient the risks of Carac including but not limited to erythema, scaling, itching, weeping, crusting, and pain. Elidel Counseling: Patient may experience a mild burning sensation during topical application. Elidel is not approved in children less than 2 years of age. There have been case reports of hematologic and skin malignancies in patients using topical calcineurin inhibitors although causality is questionable. Erythromycin Counseling:  I discussed with the patient the risks of erythromycin including but not limited to GI upset, allergic reaction, drug rash, diarrhea, increase in liver enzymes, and yeast infections. Xelcindyz Pregnancy And Lactation Text: This medication is Pregnancy Category D and is not considered safe during pregnancy.  The risk during breast feeding is also uncertain. Xeljanz Counseling: I discussed with the patient the risks of Xeljanz therapy including increased risk of infection, liver issues, headache, diarrhea, or cold symptoms. Live vaccines should be avoided. They were instructed to call if they have any problems. Ketoconazole Pregnancy And Lactation Text: This medication is Pregnancy Category C and it isn't know if it is safe during pregnancy. It is also excreted in breast milk and breast feeding isn't recommended. Qbrexza Pregnancy And Lactation Text: There is no available data on Qbrexza use in pregnant women.  There is no available data on Qbrexza use in lactation. Winlevi Counseling:  I discussed with the patient the risks of topical clascoterone including but not limited to erythema, scaling, itching, and stinging. Patient voiced their understanding. Cimzia Pregnancy And Lactation Text: This medication crosses the placenta but can be considered safe in certain situations. Cimzia may be excreted in breast milk. Cosentyx Counseling:  I discussed with the patient the risks of Cosentyx including but not limited to worsening of Crohn's disease, immunosuppression, allergic reactions and infections.  The patient understands that monitoring is required including a PPD at baseline and must alert us or the primary physician if symptoms of infection or other concerning signs are noted. Spironolactone Pregnancy And Lactation Text: This medication can cause feminization of the male fetus and should be avoided during pregnancy. The active metabolite is also found in breast milk. Sski Pregnancy And Lactation Text: This medication is Pregnancy Category D and isn't considered safe during pregnancy. It is excreted in breast milk. Infliximab Counseling:  I discussed with the patient the risks of infliximab including but not limited to myelosuppression, immunosuppression, autoimmune hepatitis, demyelinating diseases, lymphoma, and serious infections.  The patient understands that monitoring is required including a PPD at baseline and must alert us or the primary physician if symptoms of infection or other concerning signs are noted. Rhofade Counseling: Rhofade is a topical medication which can decrease superficial blood flow where applied. Side effects are uncommon and include stinging, redness and allergic reactions. Cibinqo Counseling: I discussed with the patient the risks of Cibinqo therapy including but not limited to common cold, nausea, headache, cold sores, increased blood CPK levels, dizziness, UTIs, fatigue, acne, and vomitting. Live vaccines should be avoided.  This medication has been linked to serious infections; higher rate of mortality; malignancy and lymphoproliferative disorders; major adverse cardiovascular events; thrombosis; thrombocytopenia and lymphopenia; lipid elevations; and retinal detachment. Azithromycin Counseling:  I discussed with the patient the risks of azithromycin including but not limited to GI upset, allergic reaction, drug rash, diarrhea, and yeast infections. Tazorac Counseling:  Patient advised that medication is irritating and drying.  Patient may need to apply sparingly and wash off after an hour before eventually leaving it on overnight.  The patient verbalized understanding of the proper use and possible adverse effects of tazorac.  All of the patient's questions and concerns were addressed. Ivermectin Counseling:  Patient instructed to take medication on an empty stomach with a full glass of water.  Patient informed of potential adverse effects including but not limited to nausea, diarrhea, dizziness, itching, and swelling of the extremities or lymph nodes.  The patient verbalized understanding of the proper use and possible adverse effects of ivermectin.  All of the patient's questions and concerns were addressed. Nsaids Pregnancy And Lactation Text: These medications are considered safe up to 30 weeks gestation. It is excreted in breast milk. Doxepin Counseling:  Patient advised that the medication is sedating and not to drive a car after taking this medication. Patient informed of potential adverse effects including but not limited to dry mouth, urinary retention, and blurry vision.  The patient verbalized understanding of the proper use and possible adverse effects of doxepin.  All of the patient's questions and concerns were addressed. Topical Steroids Counseling: I discussed with the patient that prolonged use of topical steroids can result in the increased appearance of superficial blood vessels (telangiectasias), lightening (hypopigmentation) and thinning of the skin (atrophy).  Patient understands to avoid using high potency steroids in skin folds, the groin or the face.  The patient verbalized understanding of the proper use and possible adverse effects of topical steroids.  All of the patient's questions and concerns were addressed. Soolantra Counseling: I discussed with the patients the risks of topial Soolantra. This is a medicine which decreases the number of mites and inflammation in the skin. You experience burning, stinging, eye irritation or allergic reactions.  Please call our office if you develop any problems from using this medication. Topical Steroids Applications Pregnancy And Lactation Text: Most topical steroids are considered safe to use during pregnancy and lactation.  Any topical steroid applied to the breast or nipple should be washed off before breastfeeding. Low Dose Naltrexone Counseling- I discussed with the patient the potential risks and side effects of low dose naltrexone including but not limited to: more vivid dreams, headaches, nausea, vomiting, abdominal pain, fatigue, dizziness, and anxiety. Soolantra Pregnancy And Lactation Text: This medication is Pregnancy Category C. This medication is considered safe during breast feeding. Litfulo Counseling: I discussed with the patient the risks of Litfulo therapy including but not limited to upper respiratory tract infections, shingles, cold sores, and nausea. Live vaccines should be avoided.  This medication has been linked to serious infections; higher rate of mortality; malignancy and lymphoproliferative disorders; major adverse cardiovascular events; thrombosis; gastrointestinal perforations; neutropenia; lymphopenia; anemia; liver enzyme elevations; and lipid elevations. Litfulo Pregnancy And Lactation Text: Based on animal studies, Lifulo may cause embryo-fetal harm when administered to pregnant women.  The medication should not be used in pregnancy.  Breastfeeding is not recommended during treatment. Low Dose Naltrexone Pregnancy And Lactation Text: Naltrexone is pregnancy category C.  There have been no adequate and well-controlled studies in pregnant women.  It should be used in pregnancy only if the potential benefit justifies the potential risk to the fetus.   Limited data indicates that naltrexone is minimally excreted into breastmilk. Dutasteride Female Counseling: Dutasteride Counseling:  I discussed with the patient the risks of use of dutasteride including but not limited to decreased libido and sexual dysfunction. Explained the teratogenic nature of the medication and stressed the importance of not getting pregnant during treatment. All of the patient's questions and concerns were addressed. Hyrimoz Counseling:  I discussed with the patient the risks of adalimumab including but not limited to myelosuppression, immunosuppression, autoimmune hepatitis, demyelinating diseases, lymphoma, and serious infections.  The patient understands that monitoring is required including a PPD at baseline and must alert us or the primary physician if symptoms of infection or other concerning signs are noted. Sotyktu Counseling:  I discussed the most common side effects of Sotyktu including: common cold, sore throat, sinus infections, cold sores, canker sores, folliculitis, and acne.? I also discussed more serious side effects of Sotyktu including but not limited to: serious allergic reactions; increased risk for infections such as TB; cancers such as lymphomas; rhabdomyolysis and elevated CPK; and elevated triglycerides and liver enzymes.? VTAMA Counseling: I discussed with the patient that VTAMA is not for use in the eyes, mouth or mouth. They should call the office if they develop any signs of allergic reactions to VTAMA. The patient verbalized understanding of the proper use and possible adverse effects of VTAMA.  All of the patient's questions and concerns were addressed. Bimzelx Counseling:  I discussed with the patient the risks of Bimzelx including but not limited to depression, immunosuppression, allergic reactions and infections.  The patient understands that monitoring is required including a PPD at baseline and must alert us or the primary physician if symptoms of infection or other concerning signs are noted. Bimzelx Pregnancy And Lactation Text: This medication crosses the placenta and the safety is uncertain during pregnancy. It is unknown if this medication is present in breast milk. Olanzapine Counseling- I discussed with the patient the common side effects of olanzapine including but are not limited to: lack of energy, dry mouth, increased appetite, sleepiness, tremor, constipation, dizziness, changes in behavior, or restlessness.  Explained that teenagers are more likely to experience headaches, abdominal pain, pain in the arms or legs, tiredness, and sleepiness.  Serious side effects include but are not limited: increased risk of death in elderly patients who are confused, have memory loss, or dementia-related psychosis; hyperglycemia; increased cholesterol and triglycerides; and weight gain. Olanzapine Pregnancy And Lactation Text: This medication is pregnancy category C.   There are no adequate and well controlled trials with olanzapine in pregnant females.  Olanzapine should be used during pregnancy only if the potential benefit justifies the potential risk to the fetus.   In a study in lactating healthy women, olanzapine was excreted in breast milk.  It is recommended that women taking olanzapine should not breast feed. Finasteride Female Counseling: Finasteride Counseling:  I discussed with the patient the risks of use of finasteride including but not limited to decreased libido and sexual dysfunction. Explained the teratogenic nature of the medication and stressed the importance of not getting pregnant during treatment. All of the patient's questions and concerns were addressed. Topical Metronidazole Counseling: Metronidazole is a topical antibiotic medication. You may experience burning, stinging, redness, or allergic reactions.  Please call our office if you develop any problems from using this medication. Sotyktu Pregnancy And Lactation Text: There is insufficient data to evaluate whether or not Sotyktu is safe to use during pregnancy.? ?It is not known if Sotyktu passes into breast milk and whether or not it is safe to use when breastfeeding.?? Vtama Pregnancy And Lactation Text: It is unknown if this medication can cause problems during pregnancy and breastfeeding. Zoryve Counseling:  I discussed with the patient that Zoryve is not for use in the eyes, mouth or vagina. The most commonly reported side effects include diarrhea, headache, insomnia, application site pain, upper respiratory tract infections, and urinary tract infections.  All of the patient's questions and concerns were addressed. Calcipotriene Pregnancy And Lactation Text: The use of this medication during pregnancy or lactation is not recommended as there is insufficient data. Calcipotriene Counseling:  I discussed with the patient the risks of calcipotriene including but not limited to erythema, scaling, itching, and irritation. Topical Metronidazole Pregnancy And Lactation Text: This medication is Pregnancy Category B and considered safe during pregnancy.  It is also considered safe to use while breastfeeding. Cantharidin Pregnancy And Lactation Text: This medication has not been proven safe during pregnancy. It is unknown if this medication is excreted in breast milk. Cantharidin Counseling:  I discussed with the patient the risks of Cantharidin including but not limited to pain, redness, burning, itching, and blistering.